# Patient Record
Sex: MALE | Race: BLACK OR AFRICAN AMERICAN | NOT HISPANIC OR LATINO | Employment: UNEMPLOYED | URBAN - METROPOLITAN AREA
[De-identification: names, ages, dates, MRNs, and addresses within clinical notes are randomized per-mention and may not be internally consistent; named-entity substitution may affect disease eponyms.]

---

## 2024-07-09 ENCOUNTER — HOSPITAL ENCOUNTER (INPATIENT)
Facility: CLINIC | Age: 24
LOS: 20 days | Discharge: PSYCHIATRIC OR SUBSTANCE USE FACILITY | End: 2024-08-05
Attending: PSYCHIATRY & NEUROLOGY | Admitting: PSYCHIATRY & NEUROLOGY
Payer: MEDICAID

## 2024-07-09 DIAGNOSIS — G47.09 OTHER INSOMNIA: ICD-10-CM

## 2024-07-09 DIAGNOSIS — Z75.9: ICD-10-CM

## 2024-07-09 DIAGNOSIS — F41.9 ANXIETY: ICD-10-CM

## 2024-07-09 DIAGNOSIS — Z60.4 ISOLATION (SOCIAL): ICD-10-CM

## 2024-07-09 DIAGNOSIS — K59.09 OTHER CONSTIPATION: ICD-10-CM

## 2024-07-09 DIAGNOSIS — F19.10 SUBSTANCE ABUSE (H): ICD-10-CM

## 2024-07-09 DIAGNOSIS — G25.9 EXTRAPYRAMIDAL AND MOVEMENT DISORDER, UNSPECIFIED: ICD-10-CM

## 2024-07-09 DIAGNOSIS — F29 PSYCHOSIS, UNSPECIFIED PSYCHOSIS TYPE (H): ICD-10-CM

## 2024-07-09 DIAGNOSIS — F23 ACUTE PSYCHOSIS (H): ICD-10-CM

## 2024-07-09 DIAGNOSIS — Z59.86 FINANCIAL INSECURITY: Primary | ICD-10-CM

## 2024-07-09 PROBLEM — F31.9 BIPOLAR DISORDER, UNSPECIFIED (H): Status: ACTIVE | Noted: 2024-07-09

## 2024-07-09 LAB
ALBUMIN SERPL BCG-MCNC: 4.3 G/DL (ref 3.5–5.2)
ALP SERPL-CCNC: 60 U/L (ref 40–150)
ALT SERPL W P-5'-P-CCNC: 11 U/L (ref 0–70)
AMPHETAMINES UR QL SCN: NORMAL
ANION GAP SERPL CALCULATED.3IONS-SCNC: 8 MMOL/L (ref 7–15)
AST SERPL W P-5'-P-CCNC: 13 U/L (ref 0–45)
BARBITURATES UR QL SCN: NORMAL
BASOPHILS # BLD AUTO: 0.1 10E3/UL (ref 0–0.2)
BASOPHILS NFR BLD AUTO: 1 %
BENZODIAZ UR QL SCN: NORMAL
BILIRUB SERPL-MCNC: 0.3 MG/DL
BUN SERPL-MCNC: 14.7 MG/DL (ref 6–20)
BZE UR QL SCN: NORMAL
CALCIUM SERPL-MCNC: 9.9 MG/DL (ref 8.6–10)
CANNABINOIDS UR QL SCN: NORMAL
CHLORIDE SERPL-SCNC: 102 MMOL/L (ref 98–107)
CREAT SERPL-MCNC: 0.91 MG/DL (ref 0.67–1.17)
DEPRECATED HCO3 PLAS-SCNC: 30 MMOL/L (ref 22–29)
EGFRCR SERPLBLD CKD-EPI 2021: >90 ML/MIN/1.73M2
EOSINOPHIL # BLD AUTO: 0.3 10E3/UL (ref 0–0.7)
EOSINOPHIL NFR BLD AUTO: 5 %
ERYTHROCYTE [DISTWIDTH] IN BLOOD BY AUTOMATED COUNT: 12.5 % (ref 10–15)
FENTANYL UR QL: NORMAL
GLUCOSE SERPL-MCNC: 98 MG/DL (ref 70–99)
HCT VFR BLD AUTO: 42.2 % (ref 40–53)
HGB BLD-MCNC: 14.8 G/DL (ref 13.3–17.7)
IMM GRANULOCYTES # BLD: 0 10E3/UL
IMM GRANULOCYTES NFR BLD: 0 %
LYMPHOCYTES # BLD AUTO: 2.2 10E3/UL (ref 0.8–5.3)
LYMPHOCYTES NFR BLD AUTO: 35 %
MCH RBC QN AUTO: 31.8 PG (ref 26.5–33)
MCHC RBC AUTO-ENTMCNC: 35.1 G/DL (ref 31.5–36.5)
MCV RBC AUTO: 91 FL (ref 78–100)
MONOCYTES # BLD AUTO: 0.5 10E3/UL (ref 0–1.3)
MONOCYTES NFR BLD AUTO: 7 %
NEUTROPHILS # BLD AUTO: 3.3 10E3/UL (ref 1.6–8.3)
NEUTROPHILS NFR BLD AUTO: 52 %
NRBC # BLD AUTO: 0 10E3/UL
NRBC BLD AUTO-RTO: 0 /100
OPIATES UR QL SCN: NORMAL
PCP QUAL URINE (ROCHE): NORMAL
PLATELET # BLD AUTO: 225 10E3/UL (ref 150–450)
POTASSIUM SERPL-SCNC: 3.8 MMOL/L (ref 3.4–5.3)
PROT SERPL-MCNC: 6.8 G/DL (ref 6.4–8.3)
RBC # BLD AUTO: 4.65 10E6/UL (ref 4.4–5.9)
SARS-COV-2 RNA RESP QL NAA+PROBE: NEGATIVE
SODIUM SERPL-SCNC: 140 MMOL/L (ref 135–145)
TSH SERPL DL<=0.005 MIU/L-ACNC: 1.98 UIU/ML (ref 0.3–4.2)
WBC # BLD AUTO: 6.3 10E3/UL (ref 4–11)

## 2024-07-09 PROCEDURE — 80053 COMPREHEN METABOLIC PANEL: CPT | Performed by: PSYCHIATRY & NEUROLOGY

## 2024-07-09 PROCEDURE — 250N000013 HC RX MED GY IP 250 OP 250 PS 637: Performed by: PSYCHIATRY & NEUROLOGY

## 2024-07-09 PROCEDURE — 85041 AUTOMATED RBC COUNT: CPT | Performed by: PSYCHIATRY & NEUROLOGY

## 2024-07-09 PROCEDURE — 87635 SARS-COV-2 COVID-19 AMP PRB: CPT | Performed by: PSYCHIATRY & NEUROLOGY

## 2024-07-09 PROCEDURE — 99285 EMERGENCY DEPT VISIT HI MDM: CPT | Performed by: PSYCHIATRY & NEUROLOGY

## 2024-07-09 PROCEDURE — 84443 ASSAY THYROID STIM HORMONE: CPT | Performed by: PSYCHIATRY & NEUROLOGY

## 2024-07-09 PROCEDURE — 80307 DRUG TEST PRSMV CHEM ANLYZR: CPT | Performed by: PSYCHIATRY & NEUROLOGY

## 2024-07-09 PROCEDURE — 36415 COLL VENOUS BLD VENIPUNCTURE: CPT | Performed by: PSYCHIATRY & NEUROLOGY

## 2024-07-09 RX ORDER — OLANZAPINE 5 MG/1
5 TABLET, ORALLY DISINTEGRATING ORAL AT BEDTIME
Status: DISCONTINUED | OUTPATIENT
Start: 2024-07-09 | End: 2024-07-15

## 2024-07-09 RX ORDER — OLANZAPINE 10 MG/1
10 TABLET, ORALLY DISINTEGRATING ORAL 2 TIMES DAILY PRN
Status: DISCONTINUED | OUTPATIENT
Start: 2024-07-09 | End: 2024-07-30

## 2024-07-09 RX ORDER — OLANZAPINE 10 MG/2ML
10 INJECTION, POWDER, FOR SOLUTION INTRAMUSCULAR 2 TIMES DAILY PRN
Status: DISCONTINUED | OUTPATIENT
Start: 2024-07-09 | End: 2024-07-30

## 2024-07-09 RX ADMIN — OLANZAPINE 5 MG: 5 TABLET, ORALLY DISINTEGRATING ORAL at 21:12

## 2024-07-09 SDOH — ECONOMIC STABILITY - INCOME SECURITY: FINANCIAL INSECURITY: Z59.86

## 2024-07-09 SDOH — SOCIAL STABILITY - SOCIAL INSECURITY: SOCIAL EXCLUSION AND REJECTION: Z60.4

## 2024-07-09 ASSESSMENT — COLUMBIA-SUICIDE SEVERITY RATING SCALE - C-SSRS: 6. HAVE YOU EVER DONE ANYTHING, STARTED TO DO ANYTHING, OR PREPARED TO DO ANYTHING TO END YOUR LIFE?: NO

## 2024-07-09 ASSESSMENT — ACTIVITIES OF DAILY LIVING (ADL)
ADLS_ACUITY_SCORE: 35
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
## 2024-07-10 ENCOUNTER — TELEPHONE (OUTPATIENT)
Dept: BEHAVIORAL HEALTH | Facility: CLINIC | Age: 24
End: 2024-07-10
Payer: MEDICAID

## 2024-07-10 PROCEDURE — 99418 PROLNG IP/OBS E/M EA 15 MIN: CPT | Performed by: NURSE PRACTITIONER

## 2024-07-10 PROCEDURE — 99255 IP/OBS CONSLTJ NEW/EST HI 80: CPT | Performed by: NURSE PRACTITIONER

## 2024-07-10 PROCEDURE — 250N000013 HC RX MED GY IP 250 OP 250 PS 637: Performed by: PSYCHIATRY & NEUROLOGY

## 2024-07-10 RX ADMIN — OLANZAPINE 5 MG: 5 TABLET, ORALLY DISINTEGRATING ORAL at 23:05

## 2024-07-10 NOTE — ED NOTES
IP MH Referral Acuity Rating Score (RARS)    LMHP complete at referral to IP MH, with DEC; and, daily while awaiting IP MH placement. Call score to PPS.  CRITERIA SCORING   New 74 HH and Involuntary for IP MH (not adolescent) 0/1   Boarding over 24 hours 0/1   Vulnerable adult at least 55+ with multiple co morbidities; or, Patient age 11 or under 0/1   Suicide ideation without relief of precipitating factors 1/1   Current plan for suicide 0/1   Current plan for homicide 0/1   Imminent risk or actual attempt to seriously harm another without relief of factors precipitating the attempt 0/1   Severe dysfunction in daily living (ex: complete neglect for self care, extreme disruption in vegetative function, extreme deterioration in social interactions) 1/1   Recent (last 2 weeks) or current physical aggression in the ED 0/1   Restraints or seclusion episode in ED 0/1   Verbal aggression, agitation, yelling, etc., while in the ED 0/1   Active psychosis with psychomotor agitation or catatonia 1/1   Need for constant or near constant redirection (from leaving, from others, etc).  0/1   Intrusive or disruptive behaviors 0/1   TOTAL Acuity Total Score: 3     LAXMI Mg

## 2024-07-10 NOTE — TELEPHONE ENCOUNTER
R: [place]  Access Inpatient Bed Call Log  7/9/2024 11:40 PM  Intake has called facilities that have not updated their bed status within the last 12 hours.??      ADULTS:     *[place]  [place] -- [facility]: @ cap per website.  [place] -- [facility]:  @ Cap per website.    [place] -- [facility]: @ Cap per website.  [place] -- [facility]: @ Cap per website.   [place] -- [facility]: @ Cap per website.  [facility] -- [facility]: @ Cap per website.   [place] -- [facility] beds @ Posting 3 beds. Ages 18-28, Voluntary only, COVID test req'd, NO aggression, physical or sexual assault, violence hx or drug abuse, or psychosis   [place] -- [facility]: @ Cap per website.  [place] -- [facility]: @ cap per website.  [place] -- [facility]:  @ Cap per website.      Pt remains on waitlist pending appropriate placement availability.
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
## 2024-07-10 NOTE — MEDICATION SCRIBE - ADMISSION MEDICATION HISTORY
Medication Scribe Admission Medication History    Admission medication history is complete. The information provided in this note is only as accurate as the sources available at the time of the update.    Information Source(s): Patient and CareEverywhere/SureScripts via in-person    Pertinent Information: Pt reports no medications, otc. Pt has no dispense HX    Changes made to PTA medication list:  Added: None  Deleted: None  Changed: None    Allergies reviewed with patient and updates made in EHR: yes    Medication History Completed By: Kim Tomas 7/10/2024 9:51 AM    No outpatient medications have been marked as taking for the 7/9/24 encounter (Hospital Encounter).

## 2024-07-10 NOTE — ED PROVIDER NOTES
[facility] ED Mental Health Handoff Note:       Brief HPI:  This is a 24 year old male signed out to me by Dr. Duran.  See initial ED Provider note for full details of the presentation. Interval history is pertinent for no reported changes or events on the shift prior.  Patient is being admitted due to acute onset of psychosis.  Currently voluntary but deemed to be holdable if necessary.    Home meds reviewed and ordered/administered: Yes    Medically stable for inpatient mental health admission: Yes.    Evaluated by mental health: Yes. The recommendation is for inpatient mental health treatment. Bed search in process    Safety concerns: At the time I received sign out, there were no safety concerns.    Hold Status:  Active Orders   N/A            Exam:   Patient Vitals for the past 24 hrs:   BP Temp Temp src Pulse Resp SpO2   07/09/24 1859 105/71 98  F (36.7  C) Oral 78 16 99 %     General: Patient is in no acute distress and is resting comfortably.  HEENT: Normocephalic atraumatic  Neck: Supple  Cardiovascular: Heart rate normal  Pulmonary: Patient is in no respiratory distress  Extremities: No signs of any significant or life-threatening trauma.  Neurologic: No new focal neurologic deficits.        ED Course:    Medications   OLANZapine zydis (zyPREXA) ODT tab 10 mg (has no administration in time range)   OLANZapine (zyPREXA) injection 10 mg (has no administration in time range)   OLANZapine zydis (zyPREXA) ODT tab 5 mg (5 mg Oral $Given 7/9/24 2112)            There were no significant events during my shift.  The patient was seen by the psychiatry consult service, plan to continue olanzapine and plan for voluntary admission          Impression:    ICD-10-CM    1. Acute psychosis (H)  F23       2. Substance abuse (H)  F19.10           Plan:    Awaiting inpatient mental health admission/transfer.  The patient is medically clear for psychiatric admission.      RESULTS:   Results for orders placed or  "performed during the hospital encounter of 07/09/24 (from the past 24 hour(s))   [facility] Assessment Consult Order:     Status: None ()    Collection Time: 07/09/24  7:07 PM    Tona KaminskiLashay [email or web address]     7/9/2024  9:40 PM  Diagnostic Evaluation Consultation  Crisis Assessment    Patient Name: Pepe Stafford  Age:  24 year old  Legal Sex: male  Gender Identity: male  Race: Black or   Ethnicity: Not  or   Language: English      Patient was assessed: In person   Crisis Assessment Start Date: 07/09/24  Crisis Assessment Start Time: 1930  Crisis Assessment Stop Time: 2000  Patient location: [facility] EMERGENCY DEPARTMENT                             [facility]    Referral Data and Chief Complaint  Pepe Stafford presents to the ED with family/friends (with   mother). Patient is presenting to the ED for the following   concerns: Paranoia, Other (see comment) (katja, psychosis).     Factors that make the mental health crisis life threatening or   complex are:  Pt presents to the ED with his mother with concerns   of katja and psychosis. Upon assessment, pt speaks barely above a   whisper and requires several prompts in order for this writer to   clearly understand him. He tells this writer that he has been   extensively researching \"angelology\" and talks at length about   various Mu-ism references, including seraphims and cherubims.   He endorses auditory and visual hallucinations. When asked about   their content, pt tells this writer that the voices are talking   to him about the Bible \"all the time\" as well as about \"[place]\"   and \"apprenticeship\". He reports hearing \"[place] languages\" and   says that he has been speaking to a ghost. He states that the   voices are also sometimes negative in nature, such as telling him   that his mother is being harmed. Pt tells this writer that he has   also been experiencing \"visions\" of snakes, " "animals, crystals,   and eggs. He has seen himself be eaten by a shark as well as   someone stab him. Pt lives with his mother and exhibits   significant paranoia toward their roommate. He believes that   their roommate has been using her \"eyes for witchcraft\" to   terrify his mother. He also thinks that she has potentially been   coughing to hypnotize his mother. Pt believes that their roommate   is trying to trick him and has been looking at him \"like a   robot\". Pt notes that he did recently threaten their roommate as   he was concerned for his mother's safety. He reports having a   pending terroristic threat charge related to this with court on   7/24. He denies any HI toward his roommate or others at present.   When asked about SI, pt reports thinking about going to [facility]   but denies a plan or intent for suicide. Pt tells this writer   that he has been sleeping and eating very minimally. When asked   about substance use, he reports recently drinking a Four Loco and   using psilocybin mushrooms earlier this year. He states that he   is not using psilocybin mushrooms at present but did \"take a lot\"   earlier this year. He is worried that he may have caused harm to   himself from his psilocybin use. Pt tells this writer that he has   \"no clue what is going on\" at present but that he is \"sane\" and   that his only goal is to protect his mother and go back to work   to provide for her. He notes that his family has been having   difficulty affording rent and food lately..      Informed Consent and Assessment Methods  Explained the crisis assessment process, including applicable   information disclosures and limits to confidentiality, assessed   understanding of the process, and obtained consent to proceed   with the assessment.  Assessment methods included conducting a   formal interview with patient, review of medical records,   collaboration with medical staff, and obtaining relevant   collateral information from " family and community providers when   available.  : done     Patient response to interventions: acceptance expressed,   verbalizes understanding  Coping skills were attempted to reduce the crisis:  Pt engaged in   a conversation wiht this writer about his mental health.     History of the Crisis   Pt was seen in the ED at [facility] from 05/16-05/17/24 for   psychosis. At that time, pt's presentation was suspected to be   linked to recent psilocybin use. Chart review indicates that pt's   family possibly has a history of psychosis. There is mention in   the chart that pt's father has experienced similar symptoms.    Brief Psychosocial History  Family:  Single, Children no  Support System:  Parent(s)  Employment Status:  unemployed  Source of Income:  other (see comments) (family support)  Financial Environmental Concerns:  unable to afford   rent/mortgage, unemployed, unable to afford food (Pt reports that   his family has been having difficulty paying rent and purchasing   food. He would like to work but has been unable to at present due   to his mental health concerns.)  Current Hobbies:  group/social activities  Barriers in Personal Life:  mental health concerns    Significant Clinical History  Current Anxiety Symptoms:  anxious, racing thoughts, excessive   worry  Current Depression/Trauma:  sense of doom, thoughts of   death/suicide (endorses passive SI only at present)  Current Somatic Symptoms:  racing thoughts, excessive worry,   anxious  Current Psychosis/Thought Disturbance:  auditory hallucinations,   visual hallucinations (paranoid delusions)  Current Eating Symptoms:  loss of appetite  Chemical Use History:  Alcohol: Other (comments) (Pt's mother   reports that he is using alcohol excessively. Pt tells this   writer that he recently drank a Four Loco and vomited afterwards.   He reports that he does not use alcohol routinely.)  Last Use::  (unknown last date of use)  Benzodiazepines: None  Opiates:  "None  Cocaine: None  Marijuana: None  Other Use: Other (comments) (psilocybin mushrooms)  Last Use::  (Pt reports that he used psilocybin mushrooms \"months   ago\" and \"took a lot this year\". He worries that the psilocybin   mushrooms may have affected his functioning.)   Past diagnosis:  No known past diagnosis  Family history:  Other (Chart review indicates a family history   of substance use and suggests that pt's father may have also   experienced psychosis.)  Past treatment:  No known formal treatment attempts  Details of most recent treatment:  Pt has no mental healthcare   providers at present.  Other relevant history:  No other relevant history.       Collateral Information  Is there collateral information: Yes     Collateral information name, relationship, phone number:  Annette Stafford, mother, PH: [phone] (using )    What happened today: Annette's sister encouraged her to bring pt to   the ED for worsening psychosis.     What is different about patient's functioning: Since June 2024,   pt has been experiencing worsening psychosis. He has been   experiencing auditory and visual hallucinatons on a daily basis.   The voices have been telling him to do things, such as pace back   and forth. He has been \"awake at all hours and not sleeping\". He   has been making a lot of statements that do not make sense. Some   of his talk has been referencing killing. Pt lives with his   mother and, about two weeks ago, he threatened to kill their   roommate.     Concern about alcohol/drug use: Pt is drinking alcohol   excessively.     What do you think the patient needs: Pt needs hospitalization.      Has patient made comments about wanting to kill   themselves/others: yes    If d/c is recommended, can they take part in safety/aftercare   planning:  yes    Additional collateral information:  Annette is recommending that pt   remain in the hospital for further care.     Risk Assessment  [place] Suicide " "Severity Rating Scale Full Clinical Version:   7/9/24  Suicidal Ideation  Q1 Wish to be Dead (Lifetime): Yes  Q2 Non-Specific Active Suicidal Thoughts (Lifetime): Yes  3. Active Suicidal Ideation with any Methods (Not Plan) Without   Intent to Act (Lifetime): No  Q4 Active Suicidal Ideation with Some Intent to Act, Without   Specific Plan (Lifetime): No  Q5 Active Suicidal Ideation with Specific Plan and Intent   (Lifetime): No  Q6 Suicide Behavior (Lifetime): no     Suicidal Behavior (Lifetime)  Actual Attempt (Lifetime): No  Has subject engaged in non-suicidal self-injurious behavior?   (Lifetime): No  Interrupted Attempts (Lifetime): No  Aborted or Self-Interrupted Attempt (Lifetime): No  Preparatory Acts or Behavior (Lifetime): No    [place] Suicide Severity Rating Scale Recent: 7/9/24  Suicidal Ideation (Recent)  Q1 Wished to be Dead (Past Month): yes  Q2 Suicidal Thoughts (Past Month): yes (Pt tells this writer that   he has been having thoughts of \"going to heaven\" but denies a   specific suicidal plan. Chart review from his ED visit at [facility] on 05/16/24 indicates that pt was also experiencing   suicidal ideation at that time.)  Q3 Suicidal Thought Method: no  Q4 Suicidal Intent without Specific Plan: no  Q5 Suicide Intent with Specific Plan: no  Level of Risk per Screen: low risk  Intensity of Ideation (Recent)  Most Severe Ideation Rating (Past 1 Month): 2  Frequency (Past 1 Month): Daily or almost daily  Duration (Past 1 Month): 1-4 hours/a lot of time  Controllability (Past 1 Month): Easily able to control thoughts  Deterrents (Past 1 Month): Deterrents definitely stopped you from   attempting suicide  Reasons for Ideation (Past 1 Month): Completely to end or stop   the pain (You couldn't go on living with the pain or how you were   feeling)  Suicidal Behavior (Recent)  Actual Attempt (Past 3 Months): No  Total Number of Actual Attempts (Past 3 Months): 0  Has subject engaged in non-suicidal " self-injurious behavior?   (Past 3 Months): No  Interrupted Attempts (Past 3 Months): No  Total Number of Interrupted Attempts (Past 3 Months): 0  Aborted or Self-Interrupted Attempt (Past 3 Months): No  Total Number of Aborted or Self-Interrupted Attempts (Past 3   Months): 0  Preparatory Acts or Behavior (Past 3 Months): No  Total Number of Preparatory Acts (Past 3 Months): 0    Environmental or Psychosocial Events: challenging interpersonal   relationships, excessive debt, poor finances  Protective Factors: Protective Factors: strong bond to family   unit, community support, or employment    Does the patient have thoughts of harming others? Feels Like   Hurting Others: yes  Previous Attempt to Hurt Others: no  Violence Threats in Past 6 Months: Pt reports that he is   currently being charged with terroristic threats after   threatening to harm his roommate. As part of pt's paranoia, he   believes that his roommate may be causing harm to his mother,   which prompted the threats.  Current Violence Plan or Thoughts: Pt denies current violent   plans or thoughts.  Is the patient engaging in sexually inappropriate behavior?: no  Duty to warn initiated: no (No duty to warn needed at this time.)    Is the patient engaging in sexually inappropriate behavior?  no          Mental Status Exam   Affect: Constricted  Appearance: Appropriate  Attention Span/Concentration: Attentive  Eye Contact: Engaged    Fund of Knowledge: Appropriate   Language /Speech Content: Fluent  Language /Speech Volume: Soft  Language /Speech Rate/Productions: Hyperverbal, Pressured  Recent Memory: Variable  Remote Memory: Intact  Mood: Anxious  Orientation to Person: Yes   Orientation to Place: Yes  Orientation to Time of Day: Yes  Orientation to Date: Yes     Situation (Do they understand why they are here?): Answer (please   comment) (partial understanding)  Psychomotor Behavior: Normal  Thought Content: Delusions, Hallucinations, Paranoia,  "Suicidal   (passive SI only)  Thought Form: Tangential     Medication  Psychotropic medications:   Medication Orders - Psychiatric (From admission, onward)      Start     Dose/Rate Route Frequency Ordered Stop    07/09/24 2200  OLANZapine zydis (zyPREXA) ODT tab 5 mg         5 mg Oral AT BEDTIME 07/09/24 2030 07/09/24 2030  OLANZapine (zyPREXA) injection 10 mg         10 mg Intramuscular 2 TIMES DAILY PRN 07/09/24 2030 07/09/24 2029  OLANZapine zydis (zyPREXA) ODT tab 10 mg         10 mg Oral 2 TIMES DAILY PRN 07/09/24 2030               Current Care Team  Patient Care Team:  System, Provider Not In as PCP - General (Clinic)    Diagnosis  Patient Active Problem List   Diagnosis Code    Bipolar disorder, unspecified (H) F31.9    Psychosis (H) F29       Primary Problem This Admission  Active Hospital Problems    Bipolar disorder, unspecified (H) F31.9      Psychosis (H) F29    Clinical Summary and Substantiation of Recommendations   It is the recommendation of this writer that pt be admitted to an   inpatient psychiatric unit on the basis of his current episode of   katja with psychosis. Pt reports that he has been sleeping and   eating minimally and extensively researching \"angelology\". He   endorses experiencing AH/VH and believes that his roommate may be   placing his mother at harm by hypnotizing her. When asked about   SI, pt also shares with this writer that he has been thinking   about \"going to heaven\". Pt is voluntary for inpatient   hospitalization.    Severe psychiatric, behavioral or other comorbid conditions are   appropriate for management at inpatient mental health as   indicated by at least one of the following: Psychiatric Symptoms,   Impaired impulse control, judgement, or insight, Symptoms of   impact to function  Severe dysfunction in daily living is present as indicated by at   least one of the following: Incapacitation because of grave   disability, Extreme disruption in vegetative " function, Extreme   deterioration in social interactions, Complete inability to   maintain any appropriate aspect of personal responsibility in any   adult roles  Situation and expectations are appropriate for inpatient care:   Around-the-clock medical and nursing care to address symptoms and   initiate intervention is required, Voluntary treatment at lower   level of care is not feasible, Patient management/treatment at   lower level of care is not feasible or is inappropriate  Inpatient mental health services are necessary to meet patient   needs and at least one of the following: Specific condition   related to admission diagnosis is present and judged likely to   deteriorate in absence of treatment at proposed level of care      Patient coping skills attempted to reduce the crisis:  Pt engaged   in a conversation wiht this writer about his mental health.    Disposition  Recommended disposition: Inpatient Mental Health        Reviewed case and recommendations with attending provider.   Attending Name: Dr. Quiroz       Attending concurs with disposition: yes       Patient and/or validated legal guardian concurs with disposition:     yes       Final disposition:  inpatient mental health    Legal status on admission: Voluntary/Patient has signed consent   for treatment    Assessment Details   Total duration spent with the patient: 30 min     CPT code(s) utilized: 04119 - Psychotherapy for Crisis - 60   (30-74*) min    LAXMI Mg, Psychotherapist  [facility] - Triage & Transition Services  Callback: [phone]           Urine Drug Screen     Status: Normal    Collection Time: 07/09/24  9:14 PM    Narrative    The following orders were created for panel order Urine Drug Screen.  Procedure                               Abnormality         Status                     ---------                               -----------         ------                     Urine Drug Screen Panel[[id number]]      Normal               Final result                 Please view results for these tests on the individual orders.   CBC with platelets differential     Status: None    Collection Time: 07/09/24  9:14 PM    Narrative    The following orders were created for panel order CBC with platelets differential.  Procedure                               Abnormality         Status                     ---------                               -----------         ------                     CBC with platelets and d...[[id number]]                      Final result                 Please view results for these tests on the individual orders.   Comprehensive metabolic panel     Status: Abnormal    Collection Time: 07/09/24  9:14 PM   Result Value Ref Range    Sodium 140 135 - 145 mmol/L    Potassium 3.8 3.4 - 5.3 mmol/L    Carbon Dioxide (CO2) 30 (H) 22 - 29 mmol/L    Anion Gap 8 7 - 15 mmol/L    Urea Nitrogen 14.7 6.0 - 20.0 mg/dL    Creatinine 0.91 0.67 - 1.17 mg/dL    GFR Estimate >90 >60 mL/min/1.73m2    Calcium 9.9 8.6 - 10.0 mg/dL    Chloride 102 98 - 107 mmol/L    Glucose 98 70 - 99 mg/dL    Alkaline Phosphatase 60 40 - 150 U/L    AST 13 0 - 45 U/L    ALT 11 0 - 70 U/L    Protein Total 6.8 6.4 - 8.3 g/dL    Albumin 4.3 3.5 - 5.2 g/dL    Bilirubin Total 0.3 <=1.2 mg/dL   TSH with free T4 reflex     Status: Normal    Collection Time: 07/09/24  9:14 PM   Result Value Ref Range    TSH 1.98 0.30 - 4.20 uIU/mL   Asymptomatic COVID-19 Virus (Coronavirus) by PCR Nasopharyngeal     Status: Normal    Collection Time: 07/09/24  9:14 PM    Specimen: Nasopharyngeal; Swab   Result Value Ref Range    SARS CoV2 PCR Negative Negative    Narrative    Testing was performed using the Xpert Xpress SARS-CoV-2 Assay on the Cepheid Gene-Xpert Instrument Systems. Additional information about this Emergency Use Authorization (EUA) assay can be found via the Lab Guide. This test should be ordered for the detection of SARS-CoV-2 in individuals who meet SARS-CoV-2 clinical and/or  epidemiological criteria as well as from individuals without symptoms or other reasons to suspect COVID-19. Test performance for asymptomatic patients has only been established in anterior nasal swab specimens. This test is for in vitro diagnostic use under the FDA EUA for laboratories certified under CLIA to perform high complexity testing. This test has not been FDA cleared or approved. A negative result does not rule out the presence of PCR inhibitors in the specimen or target RNA concentration below the limit of detection for the assay. The possibility of a false negative should be considered if the patient's recent exposure or clinical presentation suggests COVID-19. This test was validated by the [facility]. This laboratory is certified under the Clinical Laboratory Improvement Amendments (CLIA) as qualified to perform high complexity laboratory testing.     Urine Drug Screen Panel     Status: Normal    Collection Time: 07/09/24  9:14 PM   Result Value Ref Range    Amphetamines Urine Screen Negative Screen Negative    Barbituates Urine Screen Negative Screen Negative    Benzodiazepine Urine Screen Negative Screen Negative    Cannabinoids Urine Screen Negative Screen Negative    Cocaine Urine Screen Negative Screen Negative    Fentanyl Qual Urine Screen Negative Screen Negative    Opiates Urine Screen Negative Screen Negative    PCP Urine Screen Negative Screen Negative   CBC with platelets and differential     Status: None    Collection Time: 07/09/24  9:14 PM   Result Value Ref Range    WBC Count 6.3 4.0 - 11.0 10e3/uL    RBC Count 4.65 4.40 - 5.90 10e6/uL    Hemoglobin 14.8 13.3 - 17.7 g/dL    Hematocrit 42.2 40.0 - 53.0 %    MCV 91 78 - 100 fL    MCH 31.8 26.5 - 33.0 pg    MCHC 35.1 31.5 - 36.5 g/dL    RDW 12.5 10.0 - 15.0 %    Platelet Count 225 150 - 450 10e3/uL    % Neutrophils 52 %    % Lymphocytes 35 %    % Monocytes 7 %    % Eosinophils 5 %    % Basophils 1 %    %  Immature Granulocytes 0 %    NRBCs per 100 WBC 0 <1 /100    Absolute Neutrophils 3.3 1.6 - 8.3 10e3/uL    Absolute Lymphocytes 2.2 0.8 - 5.3 10e3/uL    Absolute Monocytes 0.5 0.0 - 1.3 10e3/uL    Absolute Eosinophils 0.3 0.0 - 0.7 10e3/uL    Absolute Basophils 0.1 0.0 - 0.2 10e3/uL    Absolute Immature Granulocytes 0.0 <=0.4 10e3/uL    Absolute NRBCs 0.0 10e3/uL             MD Corrine Galvan David, MD  07/10/24 2149

## 2024-07-10 NOTE — PROGRESS NOTES
Triage & Transition Services, [facility]     Pepe S [facility]  July 10, 2024      Pepe is followed related to long wait time for admission pt continues to await inpt admission.  Reports sleeping well, eating.  Is minimally responsive during brief check-in.  Patient continues to endorse need for inpatient mental health programming, review of records indicate significant history of delusional thinking, disruptive behavior, decompensation. . Please see initial [facility] Crisis Assessment completed for complete assessment information. Medical record is reviewed.  While patient is in the ED, care team is working towards Learn and Demonstrate at [employer] on Crisis Stabilization . Additional notes include Pt was seen by Debi Ortez, psychiatric nurse practitioner for medication management today.    Patient was seen yes  Mode of Assessment: Virtual: iPad    There no significant status changes.       Plan:  inpatient mental health     Plan for Care reviewed with Assigned Medical Provider?   yes    Comments:  Dr Corrine Anderson will follow and meet with patient/family/care team as able or requested.     Luz Patel, [facility], Extended Care   [phone]

## 2024-07-10 NOTE — PROGRESS NOTES
"Pepe MCCONNELL Kpelisabeth  July 9, 2024  Plan of Care Hand-off Note     Patient Care Path: inpatient mental health    Plan for Care:   It is the recommendation of this writer that pt be admitted to an inpatient psychiatric unit on the basis of his current episode of katja with psychosis. Pt reports that he has been sleeping and eating minimally and extensively researching \"angelology\". He endorses experiencing AH/VH and believes that his roommate may be placing his mother at harm by hypnotizing her. When asked about SI, pt also shares with this writer that he has been thinking about \"going to heaven\". Pt is voluntary for inpatient hospitalization.    Identified Goals and Safety Issues: decrease katja and psychosis    Overview:  Annette Stafford, mother, PH: [phone] (needs )      Legal Status at Admission: Voluntary/Patient has signed consent for treatment    Psychiatry Consult: A psychiatry consult has been placed.    Updated MD and RN regarding plan of care.      Lashay Mckeon, [facility]        "

## 2024-07-10 NOTE — TELEPHONE ENCOUNTER
7:41 AM: Intake called PC to see if they can review. Intake to fax Pt info.    7:52 AM: Pt face sheet, labs and ED notes sent to PC via fax.    8:19 AM: PC called requesting medical clearance from Provider and wanting to know why Pt received Zyprexa last night.    8:36 AM: Intake called to speak with RN at [facility] ED. RN unavailable. HUC to inform the team about med clearance needed and PC inquiring about Zyprexa administration.    9:47 AM: Intake faxed recent assessment to PC stating that Zyprexa will be continued for irritation and agitation.    9:51 AM: Needing high level of care d/t current Sx and d/t threats towards roommate.        R: [place]  Access Inpatient Bed Call Log 7/10/24 8:10 AM    Intake has called facilities that have not updated the bed status within the last 12 hours.                             [facility] is at capacity           [facility] is posting 0 beds. [phone]   [facility] is posting 0 beds. Negative covid required  [facility] is posting 0 beds. Neg covid. No high school/Irish-psych. [phone]. Per call at 8:10am, at cab. Can try after 9:30am  [facility] is posting 0 beds. [phone]  [facility] is posting 0 beds. [phone]   [facility] is posting 3 beds. Negative covid. [phone]. Per call @7:38am  [facility] is posting 0 beds [phone]      Pt remains on the work list pending appropriate bed availability.

## 2024-07-10 NOTE — CONSULTS
"Pepe MCCONNELL [facility] MRN# [id number]   Age: 24 year old YOB: 2000   Date of Admission to ED: 7/9/2024    In person visit Details:     Patient was assessed and interviewed face-to-face in person with this writer   Assessment methods included conducting a formal interview with patient, review of medical records, collaboration with medical staff, and obtaining relevant collateral information from family and community providers when available.    PHI Goncalves CNP            Contacts:   Attending Physician: Roe Castle MD     Current Outpatient Psychiatrist: none   Primary Care Provider: System, Provider Not In  Family/friends/guardian: mother/Annette [facility] [phone]  Per [facility] ED notes: \"Mom and roommate are both deaf.  line was used for ASL.\"  Family/friends/guardian: aunt in [place]/[facility] - [phone] who had custody of the Patient from age [age]yo in [place]. Patient relocated to [place] to mom's about 2-3 years ago. (Per [facility] ED notes)  Therapist:   :        Reason for Consult:       Psychiatry IP Consult: katja psychosis  Requesting Provider:  ED Provider           History of Present Illness:   Patient presented to the ED on 7/9/2024 for psychosis, in the context of he was hearing things that were saying bad things.      Interview with patient:   Pepe reports his mom wanted him to come to the ED because she was concerned.  He reports there has been a lot going on in his house.  He has been hearing things.  He has not been able to sleep.  He stays up and plays games all night and then sometimes sleeps during the day.  He said the stress at the house has been up.  He reports he through up recently for no reason.  He has been doing way to much.  He was not able to say what he was doing. He reports his mom would not look at him the other day.  There was a roommate living with them.  He called the police on the roommate several times for threatening his mom.  He is frustrated " every day.  The roommate called the police on him, because he was not in his right mind and threatened to stab her.  He reports he did say it out loud but the roommate is supposedly deaf.  His mom had the roommate leave last week because he had to go to senior care for threatening her and he could not return home if she was there.  He has a court case on July 20th.  He denies recreational substance use other than drinking some alcohol.  He reports he drinks Four Lokos, which is about 4%.  He denied other substance use. He reports his dad is in [place] and not really involved.  He was born in [place], and has lived in [place], [facility], and [place], before moving to [place] in 2021. He has been living with his mom for about a year. He denies current depression, anxiety or anger. Denies SI, reports he has had SI in the past.  He reports he made a suicide attempt about a month ago which led to his first admission to the ED.  He endorses AH - referring to angelology and  - saying he was walking to the store and his shoelaces came untied.  He also reports seeing floating eyes. He has not been eating because there is no food to eat.  Denies problems with elimination.      Substance use is relevant for alcohol and mushrooms. UDS in ED was negative.     Social Hx:  Living situation: Lives with his mom.  Roommate that was living with them was recently asked to leave due to pending court case between the roommate and the patient.   Highest level of education: did not finish high school - reports he would like to finish high school   Employment status: currently unemployed, reports he was working at [employer] until a few weeks ago when he was suddenly fired.   Financial stressors: yes - reports unemployed and that there is no food in the house.   Family/Friends/Support ppl: Mom  Legal Issues: yes, court date on July 20th for threatening to stab the roommate that has since left the home.  Patient reports he thought the roommate was threatening his mom.  "      Collateral information from the famly/friend: none         Collateral information from chart review:     Reviewed DEC assessment and ED notes.     Per DEC assessment completed on 7/9/2024:   \" Upon assessment, pt speaks barely above a whisper and requires several prompts in order for this writer to clearly understand him. He tells this writer that he has been extensively researching \"angelology\" and talks at length about various Latter-day references, including seraphims and cherubims. He endorses auditory and visual hallucinations. When asked about their content, pt tells this writer that the voices are talking to him about the Bible \"all the time\" as well as about \"[place]\" and \"apprenticeship\". He reports hearing \"[place] languages\" and says that he has been speaking to a ghost. He states that the voices are also sometimes negative in nature, such as telling him that his mother is being harmed. Pt tells this writer that he has also been experiencing \"visions\" of snakes, animals, crystals, and eggs. He has seen himself be eaten by a shark as well as someone stab him. Pt lives with his mother and exhibits significant paranoia toward their roommate. He believes that their roommate has been using her \"eyes for witchcraft\" to terrify his mother. He also thinks that she has potentially been coughing to hypnotize his mother. Pt believes that their roommate is trying to trick him and has been looking at him \"like a robot\". Pt notes that he did recently threaten their roommate as he was concerned for his mother's safety. He reports having a pending terroristic threat charge related to this with court on 7/24. He denies any HI toward his roommate or others at present. When asked about SI, pt [facility] but denies a plan or intent for suicide. Pt tells this writer that he has been sleeping and eating very minimally. When asked about substance use, he reports recently drinking a Four Loco and " "using psilocybin mushrooms earlier this year. He states that he is not using psilocybin mushrooms at present but did \"take a lot\" earlier this year. He is worried that he may have caused harm to himself from his psilocybin use. Pt tells this writer that he has \"no clue what is going on\" at present but that he is \"sane\" and that his only goal is to protect his mother and go back to work to provide for her. He notes that his family has been having difficulty affording rent and food lately.\"    Per [facility] ED notes 5/17/2024 by Perry Cage LPC:   \"\"He has been acting strange for 2-3 weeks. He will drink and but she didn't know that he has been drinking to excess, smoking weed, and saying that he is talking to the devil and talking to spirits. Pt suffers from depression, has been sensitive and defensive, but they've been dealing with that for several years. She reports that she can't hear so she can't hear what is going on and she works nights so she can't really see what she is up to. However, the son has been acting very different and has been very angry. Over the past year he has been damaging doors and walls. She found a knife in his room and vodka.\"              Psychiatric History:     As documented in HPI, Impression, collateral information from chart review & family/friend/guardian, DEC assessment and as listed below (not exhaustive).    Past Psychiatric Diagnosis:   Patient denies.     Past Medication Trials: none      Trauma/Abuse history: Denied.           Past Medical and Surgical History:     PAST MEDICAL HISTORY: No past medical history on file.    PAST SURGICAL HISTORY: No past surgical history on file.          Substance Use History:   As documented in HPI, Impression, collateral information from chart review & family/friend/guardian, DEC assessment and as listed below (not exhaustive).    Substance Use:     Yes, alcohol and mushrooms          Family History:   As documented in HPI, " "Impression, collateral information from chart review & family/friend/guardian, DEC assessment and as listed below (not exhaustive).    Family psychiatric/mental health history includes:     None known at this time.           Allergies and Medications:   No Known Allergies    Medications:    Current Facility-Administered Medications   Medication Dose Route Frequency Provider Last Rate Last Admin    OLANZapine (zyPREXA) injection 10 mg  10 mg Intramuscular BID PRN Viraj Quiroz MD        OLANZapine zydis (zyPREXA) ODT tab 10 mg  10 mg Oral BID PRN Viraj Quiroz MD        OLANZapine zydis (zyPREXA) ODT tab 5 mg  5 mg Oral At Bedtime Viraj Quiroz MD   5 mg at 07/09/24 2112     No current outpatient medications on file.           Mental Status Exam:   Appearance:  awake, alert, adequately groomed, and casually dressed, thin, black male with facial hair  Attitude:  cooperative  Eye Contact:  fair  Mood:   \"elated\"  Affect:  calm and neutral  Speech:   mostly clear and coherent, some mumbling  Psychomotor Behavior:  no evidence of tardive dyskinesia, dystonia, or tics  Thought Process:   mostly linear, some disorganization and memory issues  Associations:  no loose associations  Thought Content:   denies SI/HI, endorses AH/VH  Insight:  limited  Judgment:  fair  Oriented to:  time, person, and place  Attention Span and Concentration:  fair  Recent and Remote Memory:  limited  Fund of Knowledge: low-normal  Muscle Strength and Tone: normal  Gait and Station:  not observed.         Physical Exam:     /71   Pulse 78   Temp 98  F (36.7  C) (Oral)   Resp 16   SpO2 99%   Weight is 0 lbs 0 oz Data Unavailable There is no height or weight on file to calculate BMI.    QTc: none, no EKG to review    Laboratory/Imaging:    Recent Results (from the past 72 hour(s))   Comprehensive metabolic panel    Collection Time: 07/09/24  9:14 PM   Result Value Ref Range    Sodium 140 135 - 145 mmol/L    Potassium 3.8 " 3.4 - 5.3 mmol/L    Carbon Dioxide (CO2) 30 (H) 22 - 29 mmol/L    Anion Gap 8 7 - 15 mmol/L    Urea Nitrogen 14.7 6.0 - 20.0 mg/dL    Creatinine 0.91 0.67 - 1.17 mg/dL    GFR Estimate >90 >60 mL/min/1.73m2    Calcium 9.9 8.6 - 10.0 mg/dL    Chloride 102 98 - 107 mmol/L    Glucose 98 70 - 99 mg/dL    Alkaline Phosphatase 60 40 - 150 U/L    AST 13 0 - 45 U/L    ALT 11 0 - 70 U/L    Protein Total 6.8 6.4 - 8.3 g/dL    Albumin 4.3 3.5 - 5.2 g/dL    Bilirubin Total 0.3 <=1.2 mg/dL   TSH with free T4 reflex    Collection Time: 07/09/24  9:14 PM   Result Value Ref Range    TSH 1.98 0.30 - 4.20 uIU/mL   Asymptomatic COVID-19 Virus (Coronavirus) by PCR Nasopharyngeal    Collection Time: 07/09/24  9:14 PM    Specimen: Nasopharyngeal; Swab   Result Value Ref Range    SARS CoV2 PCR Negative Negative   Urine Drug Screen Panel    Collection Time: 07/09/24  9:14 PM   Result Value Ref Range    Amphetamines Urine Screen Negative Screen Negative    Barbituates Urine Screen Negative Screen Negative    Benzodiazepine Urine Screen Negative Screen Negative    Cannabinoids Urine Screen Negative Screen Negative    Cocaine Urine Screen Negative Screen Negative    Fentanyl Qual Urine Screen Negative Screen Negative    Opiates Urine Screen Negative Screen Negative    PCP Urine Screen Negative Screen Negative   CBC with platelets and differential    Collection Time: 07/09/24  9:14 PM   Result Value Ref Range    WBC Count 6.3 4.0 - 11.0 10e3/uL    RBC Count 4.65 4.40 - 5.90 10e6/uL    Hemoglobin 14.8 13.3 - 17.7 g/dL    Hematocrit 42.2 40.0 - 53.0 %    MCV 91 78 - 100 fL    MCH 31.8 26.5 - 33.0 pg    MCHC 35.1 31.5 - 36.5 g/dL    RDW 12.5 10.0 - 15.0 %    Platelet Count 225 150 - 450 10e3/uL    % Neutrophils 52 %    % Lymphocytes 35 %    % Monocytes 7 %    % Eosinophils 5 %    % Basophils 1 %    % Immature Granulocytes 0 %    NRBCs per 100 WBC 0 <1 /100    Absolute Neutrophils 3.3 1.6 - 8.3 10e3/uL    Absolute Lymphocytes 2.2 0.8 - 5.3 10e3/uL     Absolute Monocytes 0.5 0.0 - 1.3 10e3/uL    Absolute Eosinophils 0.3 0.0 - 0.7 10e3/uL    Absolute Basophils 0.1 0.0 - 0.2 10e3/uL    Absolute Immature Granulocytes 0.0 <=0.4 10e3/uL    Absolute NRBCs 0.0 10e3/uL       ROS: 10 point ROS neg other than the symptoms noted above in the HPI.     I have reviewed the physical done by the ED provider on 7/9/2024. Notable changes include: none            Impression:   This patient is a 24 year old year old black male with a past psychiatric history of  one previous presentation to the ED for psychosis that was attributed to using mushrooms, who presented to the ED on 7/9/2024  for psychosis.  Prior to presenting to the ED he was expereincing AH/VH and paranoia.    Significant symptoms are noted in the HPI. There is genetic loading for none known.  Medical history does not appear to be significant.  Substance use does appear to be playing a contributing role in the patient's presentation. He has a history of using mushrooms and reports to drinking alcohol recently.  Major stressors are chronic mental health issues with recent exacerbation of symptoms including psychosis.  Patient appears to cope with stress/frustration/emotion by using substances and aggression.  Patient's support system includes family. Protective factors: family and engaged in treatment. Risk factors: maladaptive coping, substance use, impulsive, and past behaviors. Patient Strengths: help seeking, engaging with ED treatment team, and agreeable to voluntary admission.    Based on interview with patient and patient's guardian/parent, record review, conversations ED staff, P staff and ED attending, the patient meets criteria for unspecified psychosis.  Current medications are listed below.  Recommend continuing Zyprexa as ordered in the ED.  Acute inpatient stabilization is needed as indicated by ongoing psychosis and inability to function in the community and need for safety and stabilization.  .   Other recommendations are listed below.    Risk for harm is moderate-high.    Brief Therapeutic Intervention(s):   Provided rappport building, active listening, unconditional positive regard, and validation.    Legal Status: Voluntary           Diagnoses:   Principal Diagnosis: Unspecified psychosis     Secondary psychiatric diagnoses of concern this admission:  R/O schizophrenia/schizoaffective disorder vs bipolar with psychosis vs substance induced psychosis    Current medical diagnosis being treated:   none         Recommendations:     Discussed the case and writer's recommendations with Dr Roe Mckeon MD, ED provider.    Recommend acute inpatient stabilization based on ongoing psychosis and inability to function in the community and need for safety and stabilization.   2.   Recommend Continuing:    Zyprexa ODT 5 mg hs    Zyprexa IM/ODT 10 mg bid prn for aggression or agitation  .   Continue to consult psychiatry as needed.    Please call [facility]/DEC at [phone] if you have follow-up questions or wish to place another consult.         Attestation       Attestation:  Patient time: 25 minutes  Reviewed labs, EKG, and relevant imagin minutes  Family/guardian/other time: 0 minutes  Team time:15  Chart review: 30 minutes  Documentation: 40 minutes  Total time: 115 minutes spent on the date of the encounter.    I have provided critical care services at the bedside in the [facility] adult emergency department,  evaluating the patient, reviewing notes and laboratory values and directing care. I have discussed recommendation regarding whether or not hospitalization is needed and recommendations for medications and laboratory testing with the attending emergency department provider. Debi Giles, DNP, APRN, CNP July 10, 2024.    Disclaimer: This note consists of symbols derived from keyboarding, dictation, and/or voice recognition software. As a result, there may be errors in the script that have gone  undetected.  Please consider this when interpreting information found in the chart.

## 2024-07-10 NOTE — CONSULTS
"Diagnostic Evaluation Consultation  Crisis Assessment    Patient Name: Pepe Stafford  Age:  24 year old  Legal Sex: male  Gender Identity: male  Race: Black or   Ethnicity: Not  or   Language: English      Patient was assessed: In person   Crisis Assessment Start Date: 07/09/24  Crisis Assessment Start Time: 1930  Crisis Assessment Stop Time: 2000  Patient location: [facility] EMERGENCY DEPARTMENT                             URE-K    Referral Data and Chief Complaint  Pepe Stafford presents to the ED with family/friends (with mother). Patient is presenting to the ED for the following concerns: Paranoia, Other (see comment) (katja, psychosis).   Factors that make the mental health crisis life threatening or complex are:  Pt presents to the ED with his mother with concerns of katja and psychosis. Upon assessment, pt speaks barely above a whisper and requires several prompts in order for this writer to clearly understand him. He tells this writer that he has been extensively researching \"angelology\" and talks at length about various Sikhism references, including seraphims and cherubims. He endorses auditory and visual hallucinations. When asked about their content, pt tells this writer that the voices are talking to him about the Bible \"all the time\" as well as about \"[place]\" and \"apprenticeship\". He reports hearing \"[place] languages\" and says that he has been speaking to a ghost. He states that the voices are also sometimes negative in nature, such as telling him that his mother is being harmed. Pt tells this writer that he has also been experiencing \"visions\" of snakes, animals, crystals, and eggs. He has seen himself be eaten by a shark as well as someone stab him. Pt lives with his mother and exhibits significant paranoia toward their roommate. He believes that their roommate has been using her \"eyes for witchcraft\" to terrify his mother. He also thinks that she has " "potentially been coughing to hypnotize his mother. Pt believes that their roommate is trying to trick him and has been looking at him \"like a robot\". Pt notes that he did recently threaten their roommate as he was concerned for his mother's safety. He reports having a pending terroristic threat charge related to this with court on 7/24. He denies any HI toward his roommate or others at present. When asked about SI, pt reports thinking about going to [facility] but denies a plan or intent for suicide. Pt tells this writer that he has been sleeping and eating very minimally. When asked about substance use, he reports recently drinking a Four Loco and using psilocybin mushrooms earlier this year. He states that he is not using psilocybin mushrooms at present but did \"take a lot\" earlier this year. He is worried that he may have caused harm to himself from his psilocybin use. Pt tells this writer that he has \"no clue what is going on\" at present but that he is \"sane\" and that his only goal is to protect his mother and go back to work to provide for her. He notes that his family has been having difficulty affording rent and food lately..      Informed Consent and Assessment Methods  Explained the crisis assessment process, including applicable information disclosures and limits to confidentiality, assessed understanding of the process, and obtained consent to proceed with the assessment.  Assessment methods included conducting a formal interview with patient, review of medical records, collaboration with medical staff, and obtaining relevant collateral information from family and community providers when available.  : done     Patient response to interventions: acceptance expressed, verbalizes understanding  Coping skills were attempted to reduce the crisis:  Pt engaged in a conversation wiht this writer about his mental health.     History of the Crisis   Pt was seen in the ED at [facility] from 05/16-05/17/24 for " "psychosis. At that time, pt's presentation was suspected to be linked to recent psilocybin use. Chart review indicates that pt's family possibly has a history of psychosis. There is mention in the chart that pt's father has experienced similar symptoms.    Brief Psychosocial History  Family:  Single, Children no  Support System:  Parent(s)  Employment Status:  unemployed  Source of Income:  other (see comments) (family support)  Financial Environmental Concerns:  unable to afford rent/mortgage, unemployed, unable to afford food (Pt reports that his family has been having difficulty paying rent and purchasing food. He would like to work but has been unable to at present due to his mental health concerns.)  Current Hobbies:  group/social activities  Barriers in Personal Life:  mental health concerns    Significant Clinical History  Current Anxiety Symptoms:  anxious, racing thoughts, excessive worry  Current Depression/Trauma:  sense of doom, thoughts of death/suicide (endorses passive SI only at present)  Current Somatic Symptoms:  racing thoughts, excessive worry, anxious  Current Psychosis/Thought Disturbance:  auditory hallucinations, visual hallucinations (paranoid delusions)  Current Eating Symptoms:  loss of appetite  Chemical Use History:  Alcohol: Other (comments) (Pt's mother reports that he is using alcohol excessively. Pt tells this writer that he recently drank a Four Loco and vomited afterwards. He reports that he does not use alcohol routinely.)  Last Use::  (unknown last date of use)  Benzodiazepines: None  Opiates: None  Cocaine: None  Marijuana: None  Other Use: Other (comments) (psilocybin mushrooms)  Last Use::  (Pt reports that he used psilocybin mushrooms \"months ago\" and \"took a lot this year\". He worries that the psilocybin mushrooms may have affected his functioning.)   Past diagnosis:  No known past diagnosis  Family history:  Other (Chart review indicates a family history of substance use " "and suggests that pt's father may have also experienced psychosis.)  Past treatment:  No known formal treatment attempts  Details of most recent treatment:  Pt has no mental healthcare providers at present.  Other relevant history:  No other relevant history.       Collateral Information  Is there collateral information: Yes     Collateral information name, relationship, phone number:  Annette Stafford, mother, PH: [phone] (using )    What happened today: Annette's sister encouraged her to bring pt to the ED for worsening psychosis.     What is different about patient's functioning: Since June 2024, pt has been experiencing worsening psychosis. He has been experiencing auditory and visual hallucinatons on a daily basis. The voices have been telling him to do things, such as pace back and forth. He has been \"awake at all hours and not sleeping\". He has been making a lot of statements that do not make sense. Some of his talk has been referencing killing. Pt lives with his mother and, about two weeks ago, he threatened to kill their roommate.     Concern about alcohol/drug use: Pt is drinking alcohol excessively.     What do you think the patient needs: Pt needs hospitalization.      Has patient made comments about wanting to kill themselves/others: yes    If d/c is recommended, can they take part in safety/aftercare planning:  yes    Additional collateral information:  Annette is recommending that pt remain in the hospital for further care.     Risk Assessment  [place] Suicide Severity Rating Scale Full Clinical Version: 7/9/24  Suicidal Ideation  Q1 Wish to be Dead (Lifetime): Yes  Q2 Non-Specific Active Suicidal Thoughts (Lifetime): Yes  3. Active Suicidal Ideation with any Methods (Not Plan) Without Intent to Act (Lifetime): No  Q4 Active Suicidal Ideation with Some Intent to Act, Without Specific Plan (Lifetime): No  Q5 Active Suicidal Ideation with Specific Plan and Intent (Lifetime): No  Q6 " "Suicide Behavior (Lifetime): no     Suicidal Behavior (Lifetime)  Actual Attempt (Lifetime): No  Has subject engaged in non-suicidal self-injurious behavior? (Lifetime): No  Interrupted Attempts (Lifetime): No  Aborted or Self-Interrupted Attempt (Lifetime): No  Preparatory Acts or Behavior (Lifetime): No    [place] Suicide Severity Rating Scale Recent: 7/9/24  Suicidal Ideation (Recent)  Q1 Wished to be Dead (Past Month): yes  Q2 Suicidal Thoughts (Past Month): yes (Pt tells this writer that he has been having thoughts of \"going to heaven\" but denies a specific suicidal plan. Chart review from his ED visit at [facility] on 05/16/24 indicates that pt was also experiencing suicidal ideation at that time.)  Q3 Suicidal Thought Method: no  Q4 Suicidal Intent without Specific Plan: no  Q5 Suicide Intent with Specific Plan: no  Level of Risk per Screen: low risk  Intensity of Ideation (Recent)  Most Severe Ideation Rating (Past 1 Month): 2  Frequency (Past 1 Month): Daily or almost daily  Duration (Past 1 Month): 1-4 hours/a lot of time  Controllability (Past 1 Month): Easily able to control thoughts  Deterrents (Past 1 Month): Deterrents definitely stopped you from attempting suicide  Reasons for Ideation (Past 1 Month): Completely to end or stop the pain (You couldn't go on living with the pain or how you were feeling)  Suicidal Behavior (Recent)  Actual Attempt (Past 3 Months): No  Total Number of Actual Attempts (Past 3 Months): 0  Has subject engaged in non-suicidal self-injurious behavior? (Past 3 Months): No  Interrupted Attempts (Past 3 Months): No  Total Number of Interrupted Attempts (Past 3 Months): 0  Aborted or Self-Interrupted Attempt (Past 3 Months): No  Total Number of Aborted or Self-Interrupted Attempts (Past 3 Months): 0  Preparatory Acts or Behavior (Past 3 Months): No  Total Number of Preparatory Acts (Past 3 Months): 0    Environmental or Psychosocial Events: challenging interpersonal " relationships, excessive debt, poor finances  Protective Factors: Protective Factors: strong bond to family unit, community support, or employment    Does the patient have thoughts of harming others? Feels Like Hurting Others: yes  Previous Attempt to Hurt Others: no  Violence Threats in Past 6 Months: Pt reports that he is currently being charged with terroristic threats after threatening to harm his roommate. As part of pt's paranoia, he believes that his roommate may be causing harm to his mother, which prompted the threats.  Current Violence Plan or Thoughts: Pt denies current violent plans or thoughts.  Is the patient engaging in sexually inappropriate behavior?: no  Duty to warn initiated: no (No duty to warn needed at this time.)    Is the patient engaging in sexually inappropriate behavior?  no        Mental Status Exam   Affect: Constricted  Appearance: Appropriate  Attention Span/Concentration: Attentive  Eye Contact: Engaged    Fund of Knowledge: Appropriate   Language /Speech Content: Fluent  Language /Speech Volume: Soft  Language /Speech Rate/Productions: Hyperverbal, Pressured  Recent Memory: Variable  Remote Memory: Intact  Mood: Anxious  Orientation to Person: Yes   Orientation to Place: Yes  Orientation to Time of Day: Yes  Orientation to Date: Yes     Situation (Do they understand why they are here?): Answer (please comment) (partial understanding)  Psychomotor Behavior: Normal  Thought Content: Delusions, Hallucinations, Paranoia, Suicidal (passive SI only)  Thought Form: Tangential     Medication  Psychotropic medications:   Medication Orders - Psychiatric (From admission, onward)      Start     Dose/Rate Route Frequency Ordered Stop    07/09/24 2200  OLANZapine zydis (zyPREXA) ODT tab 5 mg         5 mg Oral AT BEDTIME 07/09/24 2030 07/09/24 2030  OLANZapine (zyPREXA) injection 10 mg         10 mg Intramuscular 2 TIMES DAILY PRN 07/09/24 2030 07/09/24 2029  OLANZapine zydis (zyPREXA)  "ODT tab 10 mg         10 mg Oral 2 TIMES DAILY PRN 07/09/24 2030               Current Care Team  Patient Care Team:  System, Provider Not In as PCP - General (Clinic)    Diagnosis  Patient Active Problem List   Diagnosis Code    Bipolar disorder, unspecified (H) F31.9    Psychosis (H) F29       Primary Problem This Admission  Active Hospital Problems    Bipolar disorder, unspecified (H) F31.9      Psychosis (H) F29    Clinical Summary and Substantiation of Recommendations   It is the recommendation of this writer that pt be admitted to an inpatient psychiatric unit on the basis of his current episode of katja with psychosis. Pt reports that he has been sleeping and eating minimally and extensively researching \"angelology\". He endorses experiencing AH/VH and believes that his roommate may be placing his mother at harm by hypnotizing her. When asked about SI, pt also shares with this writer that he has been thinking about \"going to heaven\". Pt is voluntary for inpatient hospitalization.    Severe psychiatric, behavioral or other comorbid conditions are appropriate for management at inpatient mental health as indicated by at least one of the following: Psychiatric Symptoms, Impaired impulse control, judgement, or insight, Symptoms of impact to function  Severe dysfunction in daily living is present as indicated by at least one of the following: Incapacitation because of grave disability, Extreme disruption in vegetative function, Extreme deterioration in social interactions, Complete inability to maintain any appropriate aspect of personal responsibility in any adult roles  Situation and expectations are appropriate for inpatient care: Around-the-clock medical and nursing care to address symptoms and initiate intervention is required, Voluntary treatment at lower level of care is not feasible, Patient management/treatment at lower level of care is not feasible or is inappropriate  Inpatient mental health services are " necessary to meet patient needs and at least one of the following: Specific condition related to admission diagnosis is present and judged likely to deteriorate in absence of treatment at proposed level of care      Patient coping skills attempted to reduce the crisis:  Pt engaged in a conversation wiht this writer about his mental health.    Disposition  Recommended disposition: Inpatient Mental Health        Reviewed case and recommendations with attending provider. Attending Name: Dr. Quiroz       Attending concurs with disposition: yes       Patient and/or validated legal guardian concurs with disposition:   yes       Final disposition:  inpatient mental health    Legal status on admission: Voluntary/Patient has signed consent for treatment    Assessment Details   Total duration spent with the patient: 30 min     CPT code(s) utilized: 50785 - Psychotherapy for Crisis - 60 (30-74*) min    LAXMI Mg, Psychotherapist  [facility] - Triage & Transition Services  Callback: [phone]

## 2024-07-11 ENCOUNTER — TELEPHONE (OUTPATIENT)
Dept: BEHAVIORAL HEALTH | Facility: CLINIC | Age: 24
End: 2024-07-11
Payer: MEDICAID

## 2024-07-11 PROCEDURE — 250N000013 HC RX MED GY IP 250 OP 250 PS 637: Performed by: PSYCHIATRY & NEUROLOGY

## 2024-07-11 RX ADMIN — OLANZAPINE 5 MG: 5 TABLET, ORALLY DISINTEGRATING ORAL at 22:10

## 2024-07-11 NOTE — TELEPHONE ENCOUNTER
R: [place]  Access Inpatient Bed Call Log 7/11/2024 5:00 PM   Intake has called facilities that have not updated their bed status within the last 12 hours.??         [facility]   [place]-- [facility]: @ cap per website.   [place]-- [facility]:  Posting 0 beds. [phone] ECT Tx Patients need to go through APS.   [place]-- [facility]: Posting 0 beds. ECT Tx offered.        [place]-- [facility]:  Posting 0 beds.   [facility]-- [facility]: Posting 0 beds. [phone] ECT Tx offered.   [place] -- [facility] @ Cap per website.   [place] -- [facility] Posting 0 beds. [phone]     [facility] -- [facility]: @ cap per website.   [place] -- [facility]:  Posting 0 beds. Low acuity only. [phone]      Pt remains on worklist pending appropriate bed availability.

## 2024-07-11 NOTE — TELEPHONE ENCOUNTER
R: [place]  Access Inpatient Bed Call Log  7/10/2024 11:35 PM  Intake has called facilities that have not updated their bed status within the last 12 hours.??      ADULTS:     *METRO  [place] -- [facility]: @ cap per website.  [place] -- [facility]:  @ Cap per website.   [place] -- [facility]: @ Cap per website.  [place] -- [facility]: @ Cap per website. - Per Mihir Cruz @ 11:35PM, they are full  [place] -- [facility]: @ Cap per website.  [facility] -- [facility]: @ Cap per website.  [place] -- [facility] beds @ Cap per website. Ages 18-28, Voluntary only, COVID test req'd, NO aggression, physical or sexual assault, violence hx or drug abuse, or psychosis. -Per Debi @ 11:42PM, 1 YA bed  [place] -- [facility]: @ Cap per website.  [facility] -- [facility]: @ cap per website.  [place] -- [facility]:  @ Posting 1 bed. - Per Krystle @ 11:36PM, they are at full capacity tonight     Pt remains on waitlist pending appropriate placement availability.

## 2024-07-11 NOTE — PROGRESS NOTES
"Triage & Transition Services, Extended Care     Therapy Progress Note    Patient: Pepe goes by \"Pepe,\" uses he/him pronouns  Date of Service: July 11, 2024  Site of Service: [facility] EMERGENCY DEPARTMENT                             ED07  Patient was seen yes  Mode of Assessment: Virtual: iPad    Presentation Summary: Exchange greeting with patient. Introduced self and role. Attempted to talk about what was going on and symptoms. Pt reported everything was just a \"big misunderstanding\" and pt would rather not talk about it. There were issues with a roommate and his mom. Attempted to explore coping skills with pt. Pt kept answers brief. Pt reported he has no concerns at this. Pt reported he has been resting and \"keeping to himself\". Pt did not answer questions about psychosis, auditory or visual hallucinations, instead pt just stared at writer. Pt shared his interests and \"keeping busy, smoke\". Pt reported he would just like to rest.    Therapeutic Intervention(s) Provided: Engaged in guided discovery, explored patient's perspectives and helped expand them through socratic dialogue., Reviewed healthy living that supports positive mental health, including looking at sleep hygiene, regular movement, nutrition, and regular socialization., Provided positive reinforcement for progress towards goals, gains in knowledge, and application of skills previously taught.    Current Symptoms:  (Pt would not engage) avoidance (Pt would not engage)  (Pt would not engage) displaces blame, inattentive, high risk behavior (Pt would not engage)      Mental Status Exam   Affect: Constricted  Appearance: Appropriate  Attention Span/Concentration: Other (please comment) (variable)  Eye Contact: Variable    Fund of Knowledge: Appropriate   Language /Speech Content: Fluent  Language /Speech Volume: Soft  Language /Speech Rate/Productions: Minimally Responsive  Recent Memory: Variable  Remote Memory: Intact  Mood: " Apathetic  Orientation to Person: Yes   Orientation to Place: Yes  Orientation to Time of Day: Yes  Orientation to Date: Yes     Situation (Do they understand why they are here?): Answer (please comment) (Pt reported he is here due to a misunderstanding.)  Psychomotor Behavior: Normal  Thought Content:  (unable to assess)  Thought Form:  (unable to assess)    Treatment Objective(s) Addressed: rapport building, orienting the patient to therapy, assessing safety, identifying and practicing coping strategies, processing feelings, exploring obstacles to safety in the community, building distress tolerance    Patient Response to Interventions: refused to respond    Progress Towards Goals: Patient Reports Symptoms Are:  (unknown)  Patient Progress Toward Goals: other  Comment: Unable to assess pt as he would not talk about symptomology  Next Step to Work Toward Discharge: engaging in safety planning with collateral sources    Case Management: Case Management Included: collaborating with patient's support system  Details on Collaborating with Patient's Support System: Spoke with RN: Aj Lora  Summary of Interaction: Per RN: Pt is keeping to himself in the room. No behavioral concerns    Plan: inpatient mental health  yes provider Dr. Gonzalez  yes    Clinical Substantiation: Recommendation for inpatient mental health does not change at this time. Pt is guarded and would not talk about his symptomology. Minimally responded to prmopts from writer. Blames his mother and roommate for pt being in the ED. Pt remains to be voluntary for inpatient mental health.    Legal Status: Legal Status at Admission: Voluntary/Patient has signed consent for treatment    Session Status: Time session started: 0245  Time session ended: 0301  Session Duration (minutes): 16 minutes  Session Number: 1  Anticipated number of sessions or this episode of care: 4    Time Spent: 16 minutes    CPT Code: CPT Codes: 21806 - Psychotherapy (with patient) - 30  (16-37*) min    Diagnosis:   Patient Active Problem List   Diagnosis Code    Bipolar disorder, unspecified (H) F31.9    Psychosis (H) F29       Primary Problem This Admission: Active Hospital Problems    Bipolar disorder, unspecified (H)      Psychosis (H)        LAXMI Mcmillan   Licensed Mental Health Professional (LMHP), Extended Care  [phone]

## 2024-07-12 ENCOUNTER — TELEPHONE (OUTPATIENT)
Dept: BEHAVIORAL HEALTH | Facility: CLINIC | Age: 24
End: 2024-07-12
Payer: MEDICAID

## 2024-07-12 PROCEDURE — 250N000013 HC RX MED GY IP 250 OP 250 PS 637: Performed by: PSYCHIATRY & NEUROLOGY

## 2024-07-12 RX ADMIN — OLANZAPINE 5 MG: 5 TABLET, ORALLY DISINTEGRATING ORAL at 22:36

## 2024-07-12 NOTE — TELEPHONE ENCOUNTER
R: [place]  Access Inpatient Bed Call Log  7/12/2024 12:11 AM  Intake has called facilities that have not updated their bed status within the last 12 hours.??      ADULTS:     *METRO  [place] -- [facility]: @ cap per website.  [place] -- [facility]:  @ Cap per website.   [place] -- [facility]: @ Cap per website.  [place] -- [facility]: @ Cap per website. - 12:20 AM Per Mykel, they are able to review. Called again @ 12:40AM and Mykel reports they are now full as they are getting an admit  [place] -- [facility]: @ Cap per website.  [facility] -- [facility]: @ Cap per website.  [place] -- [facility] @ Posting 1 beds. Ages 18-28, Voluntary only, COVID test req'd, NO aggression, physical or sexual assault, violence hx or drug abuse, or psychosis - Declined 7/10 d/t needing higher level of care  [place] -- [facility]: @ Cap per website.  [facility] -- [facility]: @ cap per website.  [place] -- [facility]:  @ Cap per website.    Pt remains on waitlist pending appropriate placement availability.

## 2024-07-12 NOTE — ED NOTES
Pt went to the shower last night with 2 staff members and security; no events to note. Pt changed his clothes and bed after the shower; pt's clothes in the wash. Pt was respectful, calm, and cooperative all shift. No events of note. Pt is currently sleeping.

## 2024-07-13 ENCOUNTER — TELEPHONE (OUTPATIENT)
Dept: BEHAVIORAL HEALTH | Facility: CLINIC | Age: 24
End: 2024-07-13
Payer: MEDICAID

## 2024-07-13 PROCEDURE — 250N000013 HC RX MED GY IP 250 OP 250 PS 637: Performed by: PSYCHIATRY & NEUROLOGY

## 2024-07-13 RX ADMIN — OLANZAPINE 5 MG: 5 TABLET, ORALLY DISINTEGRATING ORAL at 21:43

## 2024-07-13 NOTE — TELEPHONE ENCOUNTER
R: [place]  Access Inpatient Bed Call Log 7/13/2024 12:00 PM   Intake has called facilities that have not updated their bed status within the last 12 hours.??         *[facility]RO   [place]-- [facility]: @ cap per website.   [place]-- [facility]:  Posting 8 beds. [phone] ECT Tx Patients need to go through APS. Per call @ 12:33 pm to APS intake, pt is considered higher acuity and would need an ICU bed which they do not have available at the moment.     [place]-- [facility]: Posting 0 beds. ECT Tx offered.        [place]-- [facility]:  Posting 0 beds.   [facility]-- [facility]: Posting 0 beds. [phone] ECT Tx offered.   [place] -- [facility] @ Cap per website.  [place] -- [facility] Posting 0 beds. [phone]     [facility] -- [facility]: @ cap per website.   [place] -- [facility]:  Posting 0 beds. Low acuity only. [phone]      Pt remains on worklist pending appropriate bed availability.

## 2024-07-13 NOTE — ED PROVIDER NOTES
[facility] ED Mental Health Handoff Note:       Brief HPI:  This is a 24 year old male signed out to me.  See initial ED Provider note for full details of the presentation. Interval history is pertinent for patient awaiting psychiatric bed assignment, ED boarding.    Home meds reviewed and ordered/administered: Yes    Medically stable for inpatient mental health admission: Yes.    Evaluated by mental health: Yes. The recommendation is for inpatient mental health treatment. Bed search in process    Safety concerns: At the time I received sign out, there were no safety concerns.    Hold Status:  Active Orders   N/A            Exam:   Patient Vitals for the past 24 hrs:   BP Pulse SpO2   07/12/24 2240 -- 67 98 %   07/12/24 2235 129/63 -- --           ED Course:    Medications   OLANZapine zydis (zyPREXA) ODT tab 10 mg ( Oral Canceled Entry 7/10/24 1059)   OLANZapine (zyPREXA) injection 10 mg (has no administration in time range)   OLANZapine zydis (zyPREXA) ODT tab 5 mg (5 mg Oral $Given 7/12/24 2236)            There were no significant events during my shift.    Patient was signed out to the oncoming provider.      Impression:    ICD-10-CM    1. Acute psychosis (H)  F23       2. Substance abuse (H)  F19.10           Plan:    Awaiting inpatient mental health admission/transfer.      RESULTS:   No results found for this visit on 07/09/24 (from the past 24 hour(s)).          MD Bandar Avery, MD James  07/13/24 9610

## 2024-07-13 NOTE — TELEPHONE ENCOUNTER
R:  [place]  Access Inpatient Bed Call Log 7/13/24 @ 12AM:  Intake has called facilities that have not updated their bed status within the last 12 hours.    [facility]     [facility]  [facility]: Posting 8 beds. Called @ 12:47AM, LV re: bed availability    [facility]: @ cap per website   [facility]: @ cap per website. Per Cheri @ 12:48AM, they are at capacity   [facility]: @ cap per website   [facility]: @ cap per website   [facility]: @ cap per website (Young Adult unit: No recent aggressions, violence or sexual assault. Neg covid required).     [facility]: @ cap per website   [facility]: @ cap per website   [facility]: @ cap per website    Pt remains on work list until appropriate placement is available

## 2024-07-13 NOTE — TELEPHONE ENCOUNTER
R: [place]  Access Inpatient Bed Call Log 7/13/24 @3:10 PM:     Intake has called facilities that have not updated the bed status within the last 12 hours.                                 [facility] is posting 0 beds.           [facility] is posting 8 beds. [phone];    Pt declined earlier due to acuity  [facility] is posting 0 beds. Negative covid required    [facility] is posting 0 beds. Neg covid. No high school/Irish-psych. [phone]. per call at 7:07 am to [place], they are at cap for the day.    [facility] is posting 0 beds. [phone]    [facility] is posting 0 beds. [phone]     [facility] is posting 0 beds. Ages 18-35. Negative covid. [phone]. Per call at 7:08 am, left vm asking for a call back re: bed avail.    [facility] is posting 0 beds.     [facility] is posting 0 beds [phone]         Pt remains on the work list pending appropriate bed availability.

## 2024-07-14 ENCOUNTER — TELEPHONE (OUTPATIENT)
Dept: BEHAVIORAL HEALTH | Facility: CLINIC | Age: 24
End: 2024-07-14
Payer: MEDICAID

## 2024-07-14 PROCEDURE — 250N000013 HC RX MED GY IP 250 OP 250 PS 637: Performed by: PSYCHIATRY & NEUROLOGY

## 2024-07-14 RX ADMIN — OLANZAPINE 5 MG: 5 TABLET, ORALLY DISINTEGRATING ORAL at 22:16

## 2024-07-14 NOTE — TELEPHONE ENCOUNTER
R: [place]  Access Inpatient Bed Call Log 7/14/2024 1:28 PM   Intake has called facilities that have not updated their bed status within the last 12 hours.??         [facility]   [place]: @ cap per website.   [place]:  Posting 8 beds. [phone] ECT Tx Patients need to go through APS. Per 7 am call to Lu[facility], they are at cap.  [place]: Posting 0 beds. ECT Tx offered.        [place]:  Posting 0 beds.   [facility]: Posting 0 beds. [phone] ECT Tx offered.   [place] -- [facility] @ Cap per website.  [place] [facility] Posting 0 beds. [phone]     [place] -- [facility]: @ cap per website.   [place] -- [facility]:  Posting 0 beds. Low acuity only. [phone]      Pt remains on worklist pending appropriate bed availability.

## 2024-07-14 NOTE — ED NOTES
"On assessment, pt stated nightmares have become more vivid over the last few nights. Pt reports still hearing voices that are stating random numbers. Pt states \"The voices are quieter but I know they're there.\"   "

## 2024-07-14 NOTE — ED PROVIDER NOTES
[facility] ED Mental Health Handoff Note:       Brief HPI:  This is a 24 year old male signed out to me by Dr. Uribe.  See initial ED Provider note for full details of the presentation. Interval history is pertinent for no acute issues.  Patient is calm and cooperative.  Sitting comfortably in the ER.  Patient is being admitted for for psychotic episode.  He is voluntary.  Patient been boarding in the ER for approximately 112 hours..    Home meds reviewed and ordered/administered: Yes    Medically stable for inpatient mental health admission: Yes.    Evaluated by mental health: Yes. The recommendation is for inpatient mental health treatment. Bed search in process    Safety concerns: At the time I received sign out, there were no safety concerns.    Hold Status:  Active Orders   N/A            Exam:   Patient Vitals for the past 24 hrs:   BP Temp Temp src Pulse Resp SpO2   07/13/24 2140 134/67 98.1  F (36.7  C) Oral 70 16 99 %   07/13/24 1559 119/76 -- -- -- 14 98 %   07/13/24 1500 -- -- -- -- -- 98 %           ED Course:    Medications   OLANZapine zydis (zyPREXA) ODT tab 10 mg ( Oral Canceled Entry 7/10/24 1059)   OLANZapine (zyPREXA) injection 10 mg (has no administration in time range)   OLANZapine zydis (zyPREXA) ODT tab 5 mg (5 mg Oral $Given 7/13/24 2143)            There were no significant events during my shift.    Patient was signed out to the oncoming provider.      Impression:    ICD-10-CM    1. Acute psychosis (H)  F23       2. Substance abuse (H)  F19.10           Plan:    Awaiting inpatient mental health admission/transfer.      RESULTS:   No results found for this visit on 07/09/24 (from the past 24 hour(s)).          MD Jojo Hudson, Ivon Stewart MD  07/14/24 8691

## 2024-07-14 NOTE — TELEPHONE ENCOUNTER
R:  [place]  Access Inpatient Bed Call Log 7/13/24 @ 11:45PM  Intake has called facilities that have not updated their bed status within the last 12 hours.    [facility]     [facility]: @ cap  [facility]: Posting 8 beds.  Per Dc @ 11:50PM, they are full. Reviewed 7/13 and reported pt needs ICU bed   [facility]: @ cap per website   [facility]: @ cap per website   [facility]: @ cap per website   [facility]: @ cap per website   [facility]: @ cap per website (Young Adult unit: No recent aggressions, violence or sexual assault. Neg covid required).  Per Ángel @ 12:24AM, no YA beds.  Declined 7/10 d/t needing higher level of care   [facility]: @ cap per website   [place]: @ cap per website   [facility]: @ cap per website     Pt remains on work list until appropriate placement is available

## 2024-07-15 ENCOUNTER — TELEPHONE (OUTPATIENT)
Dept: BEHAVIORAL HEALTH | Facility: CLINIC | Age: 24
End: 2024-07-15
Payer: MEDICAID

## 2024-07-15 PROCEDURE — 99233 SBSQ HOSP IP/OBS HIGH 50: CPT | Performed by: CLINICAL NURSE SPECIALIST

## 2024-07-15 PROCEDURE — 250N000013 HC RX MED GY IP 250 OP 250 PS 637: Performed by: CLINICAL NURSE SPECIALIST

## 2024-07-15 PROCEDURE — 99418 PROLNG IP/OBS E/M EA 15 MIN: CPT | Performed by: CLINICAL NURSE SPECIALIST

## 2024-07-15 RX ORDER — OLANZAPINE 10 MG/1
10 TABLET, ORALLY DISINTEGRATING ORAL AT BEDTIME
Status: DISCONTINUED | OUTPATIENT
Start: 2024-07-15 | End: 2024-07-17

## 2024-07-15 RX ORDER — POLYETHYLENE GLYCOL 3350 17 G/17G
17 POWDER, FOR SOLUTION ORAL DAILY
Status: DISCONTINUED | OUTPATIENT
Start: 2024-07-15 | End: 2024-08-05 | Stop reason: HOSPADM

## 2024-07-15 RX ADMIN — POLYETHYLENE GLYCOL 3350 17 G: 17 POWDER, FOR SOLUTION ORAL at 17:30

## 2024-07-15 RX ADMIN — OLANZAPINE 10 MG: 10 TABLET, ORALLY DISINTEGRATING ORAL at 22:20

## 2024-07-15 ASSESSMENT — ACTIVITIES OF DAILY LIVING (ADL)
ADLS_ACUITY_SCORE: 35
CONCENTRATING,_REMEMBERING_OR_MAKING_DECISIONS_DIFFICULTY: YES
WALKING_OR_CLIMBING_STAIRS_DIFFICULTY: NO
TOILETING_ISSUES: NO
ADLS_ACUITY_SCORE: 35
DRESSING/BATHING_DIFFICULTY: NO
ADLS_ACUITY_SCORE: 29
ADLS_ACUITY_SCORE: 35
WEAR_GLASSES_OR_BLIND: NO
ADLS_ACUITY_SCORE: 35
FALL_HISTORY_WITHIN_LAST_SIX_MONTHS: NO
ADLS_ACUITY_SCORE: 35
DIFFICULTY_EATING/SWALLOWING: NO
CHANGE_IN_FUNCTIONAL_STATUS_SINCE_ONSET_OF_CURRENT_ILLNESS/INJURY: NO
DIFFICULTY_COMMUNICATING: NO
ADLS_ACUITY_SCORE: 35
ADLS_ACUITY_SCORE: 45
ADLS_ACUITY_SCORE: 35
HEARING_DIFFICULTY_OR_DEAF: NO
DOING_ERRANDS_INDEPENDENTLY_DIFFICULTY: NO
ADLS_ACUITY_SCORE: 35

## 2024-07-15 NOTE — PROGRESS NOTES
"Triage & Transition Services, Extended Care     Therapy Progress Note    Patient: Pepe goes by \"Pepe,\" uses he/him pronouns  Date of Service: July 15, 2024  Site of Service: [facility] EMERGENCY DEPARTMENT                             ED07  Patient was seen yes  Mode of Assessment: Virtual: iPad    Presentation Summary: Pt reported that he is hearing voices.  He stated that he recently heard \"I see rainbows.\"  Pt stated that sometimes he sees a rainbow on his phone.  He stated hat when he did mushrooms, he saw rainbows.  Pt stated that he is going through some things and it is hard to function.  He described talking in tongues to family.  He stated that he saw a symbol of an owl and it's trying to take him to [place].  He also stated that he sees ancient symptoms in his head.  Pt stated that his brain hurts.  He claimed that these symptoms started \"out of the middle of nowhere.\"  Pt stated that there are a lot of things preventing him from doing what he wants to do.  He stated that there are organizations involved and they are attempting to hypnotize people.  Pt reported that his mother is frustrated becase she has \"assignments of her own.\"  He stated that his assignment is to speak to Hamzha, who is in [place], in some intelligence agency.  Pt stated that he is currently hearing a voice saying \"no one wants you.\"  Pt denied depression and stated \"it's more like confusion.\"  Pt stated that previously he had a time when he thought he killed himself by accident.  He stated that he saw himself in the mirror as gray and then there was a purple mist.  He reported having a vision of himself being eaten by a shark.  Pt also stated that he has been hearing Bible verses being recited.  He stated, \"I don't study the Bible.  I don't believe the Bible.\"  Pt stated that he feels \"off.\"  He stated that he wants to get an inpatient bed so he can get better and feel more stable.    Therapeutic Intervention(s) Provided: " Engaged in cognitive restructuring/ reframing, looked at common cognitive distortions and challenged negative thoughts., Coached on coping techniques/relaxation skills to help improve distress tolerance and managing intense emotions.    Current Symptoms:  somatic symptoms (abdominal pain, headache, tension) auditory hallucinations, visual hallucinations      Mental Status Exam   Affect: Appropriate  Appearance: Appropriate  Attention Span/Concentration: Attentive  Eye Contact: Engaged    Fund of Knowledge: Appropriate   Language /Speech Content: Fluent  Language /Speech Volume: Normal  Language /Speech Rate/Productions: Normal  Recent Memory: Variable  Remote Memory: Intact  Mood: Normal  Orientation to Person: Yes   Orientation to Place: Yes  Orientation to Time of Day: Yes  Orientation to Date: No (July 11)     Situation (Do they understand why they are here?): Yes  Psychomotor Behavior: Normal  Thought Content: Delusions, Hallucinations  Thought Form: Goal Directed    Treatment Objective(s) Addressed: rapport building, processing feelings, building distress tolerance, assessing safety    Patient Response to Interventions: verbalizes understanding    Progress Towards Goals: Patient Reports Symptoms Are: ongoing  Patient Progress Toward Goals: is not making progress  Comment: Pt reported auditory and visual hallucinations.  Next Step to Work Toward Discharge: symptom stabilization    Case Management: None    Plan: inpatient mental health  yes provider Dr. Davis  yes    Clinical Substantiation: Recommendation remains inpatient mental health.  Pt reported auditory and visual hallucinations.  He had delusional thoughts.  Pt appeared to be responding to internal stimuli at times with inappropriate affect.  He admitted to feeling off and needing help.    Legal Status: Legal Status at Admission: Voluntary/Patient has signed consent for treatment    Session Status: Time session started: 7838  Time session ended:  1327  Session Duration (minutes): 16 minutes  Session Number: 2  Anticipated number of sessions or this episode of care: 4    Time Spent: 16 minutes    CPT Code: CPT Codes: 40190 - Psychotherapy (with patient) - 30 (16-37*) min    Diagnosis:   Patient Active Problem List   Diagnosis Code    Bipolar disorder, unspecified (H) F31.9    Psychosis (H) F29       Primary Problem This Admission: Active Hospital Problems    Bipolar disorder, unspecified (H) F31.9      Psychosis (H) F29        Tracy Mcallister LP   Licensed Mental Health Professional (LMHP), [facility]  [phone]

## 2024-07-15 NOTE — ED NOTES
IP MH Referral Acuity Rating Score (RARS)    LMHP complete at referral to IP MH, with DEC; and, daily while awaiting IP MH placement. Call score to PPS.  CRITERIA SCORING   New 74 HH and Involuntary for IP MH (not adolescent) 0/1   Boarding over 24 hours 1/1   Vulnerable adult at least 55+ with multiple co morbidities; or, Patient age 11 or under 0/1   Suicide ideation without relief of precipitating factors 0/1   Current plan for suicide 0/1   Current plan for homicide 0/1   Imminent risk or actual attempt to seriously harm another without relief of factors precipitating the attempt 0/1   Severe dysfunction in daily living (ex: complete neglect for self care, extreme disruption in vegetative function, extreme deterioration in social interactions) 1/1   Recent (last 2 weeks) or current physical aggression in the ED 0/1   Restraints or seclusion episode in ED 0/1   Verbal aggression, agitation, yelling, etc., while in the ED 0/1   Active psychosis with psychomotor agitation or catatonia 1/1   Need for constant or near constant redirection (from leaving, from others, etc).  0/1   Intrusive or disruptive behaviors 0/1   TOTAL Acuity Total Score: 3

## 2024-07-15 NOTE — TELEPHONE ENCOUNTER
R: [place]  Access Inpatient Bed Call Log 7/15/24  @ 0830  Intake has called facilities that have not updated the bed status within the last 12 hours.                                 [facility] is posting 0 beds.           [facility] is posting  3  beds. [phone];    [facility] is posting 0 beds. Negative covid required    [facility] is posting 0 beds. Neg covid. No high school/Irish-psych. [phone].      [facility] is posting 0 beds. [phone]    [facility] is posting 0 beds. [phone]     [facility] is posting 0 beds. Ages 18-35. Negative covid. [phone].   - Declined due to acuity  [facility] is posting 0 beds.     [facility] is posting 0 beds [phone]         Pt remains on the work list pending appropriate bed availability.

## 2024-07-15 NOTE — TELEPHONE ENCOUNTER
R: [place]  Access Inpatient Bed Call Log 7/14/24  @3:10 PM:    Intake has called facilities that have not updated the bed status within the last 12 hours.                                 [facility] is posting 0 beds.           [facility] is posting 8 beds. [phone];  per call at 7 am to [facility], they are at cap.    [facility] is posting 0 beds. Negative covid required    [facility] is posting 0 beds. Neg covid. No high school/Irish-psych. [phone].  per call at 7:02 am to [facility], they are at cap.    [facility] is posting 0 beds. [phone]    [facility] is posting 0 beds. [phone]     [facility] is posting 0 beds. Ages 18-35. Negative covid. [phone]. Per call at 7:03 am, left vm asking for a call back re: bed availability.    [facility] is posting 0 beds.     [facility] is posting 0 beds [phone]         Pt remains on the work list pending appropriate bed availability.

## 2024-07-15 NOTE — TELEPHONE ENCOUNTER
R:  [place]  Access Inpatient Bed Call Log 7/14/24 @ 11:35PM  Intake has called facilities that have not updated their bed status within the last 12 hours.    [facility]     [facility] cap  [facility]: Posting 3 beds. Per Kaykay @ 11:35PM, they are at capacity. Reviewed 7/13 and reported pt needs ICU bed   [facility]: @ cap per website   [facility]: @ cap per website.  Per Cheri @ 11:36PM, they are full tonight  [facility]: @ cap per website   [facility]: @ cap per website   [facility]: @ cap per website (Young Adult unit: No recent aggressions, violence or sexual assault. Neg covid required). Per Debi @ 12:04AM, no YA beds. Declined 7/10 d/t needing higher level of care     [facility]: @ cap per website   [facility]: @ cap per website   [facility]: @ cap per website     Pt remains on work list until appropriate placement is available

## 2024-07-15 NOTE — TELEPHONE ENCOUNTER
R: [place]  Access Inpatient Bed Call Log 7/15/24  @4:00 PM:  Intake has called facilities that have not updated the bed status within the last 12 hours.                                           [facility] is posting 0 beds.                     [facility] is posting  3  beds. [phone];              [facility] is posting 0 beds. Negative covid required              [facility] is posting 0 beds. Neg covid. No high school/Irish-psych. [phone].                [facility] is posting 0 beds. [phone]              [facility] is posting 0 beds. [phone]               [facility] is posting 0 beds. Ages 18-35. Negative covid. [phone].               [facility] is posting 0 beds.               [facility] is posting 0 beds [phone]    6:30 PM Paged Dr. Lutz to review for 6A/ [facility].    7:58 PM Re- paged provider to review for 6A/ Karlene.    8:11 PM Pt accepted to 6A/ [facility].    8:28 PM Admit board updated, report info exchanged.    8:53 PM Pt added to the queue.    9:23 PM Indicia completed.

## 2024-07-15 NOTE — ED NOTES
Emergency Department Patient Sign-out       Brief HPI:  This is a 24 year old male signed out to me by Dr. Paiz .  See initial ED Provider note for details of the presentation.            Significant Events prior to my assuming care: n/a      Exam:   Patient Vitals for the past 24 hrs:   BP Temp Temp src Pulse SpO2   07/15/24 0655 -- -- -- -- 99 %   07/15/24 0650 111/77 97.8  F (36.6  C) Oral 76 --       General: Patient is in no acute distress currently.  HEENT: Normocephalic atraumatic.    Neck: Supple  Pulmonary: Patient is in no respiratory distress  Extremities: No signs of any significant or life-threatening trauma.  Neurologic: No new focal neurologic deficits.   Psych: Patient pleasant and with no complaints    ED RESULTS:   No results found for this visit on 07/09/24 (from the past 24 hour(s)).    ED MEDICATIONS:   Medications   OLANZapine zydis (zyPREXA) ODT tab 10 mg ( Oral Canceled Entry 7/10/24 1059)   OLANZapine (zyPREXA) injection 10 mg (has no administration in time range)   OLANZapine zydis (zyPREXA) ODT tab 10 mg (has no administration in time range)         Impression:    ICD-10-CM    1. Acute psychosis (H)  F23       2. Substance abuse (H)  F19.10           Plan:    Pending studies include n/a.    Patient continued to be pending for admission      MD Ryan Melton Collin, MD  07/15/24 0360
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
## 2024-07-16 PROBLEM — F19.10 SUBSTANCE ABUSE (H): Status: ACTIVE | Noted: 2024-07-16

## 2024-07-16 PROBLEM — F23 ACUTE PSYCHOSIS (H): Status: ACTIVE | Noted: 2024-07-16

## 2024-07-16 PROCEDURE — G0177 OPPS/PHP; TRAIN & EDUC SERV: HCPCS

## 2024-07-16 PROCEDURE — 90853 GROUP PSYCHOTHERAPY: CPT | Performed by: PSYCHOLOGIST

## 2024-07-16 PROCEDURE — 250N000013 HC RX MED GY IP 250 OP 250 PS 637: Performed by: REGISTERED NURSE

## 2024-07-16 PROCEDURE — 90853 GROUP PSYCHOTHERAPY: CPT

## 2024-07-16 PROCEDURE — 99223 1ST HOSP IP/OBS HIGH 75: CPT | Mod: AI | Performed by: REGISTERED NURSE

## 2024-07-16 PROCEDURE — 250N000013 HC RX MED GY IP 250 OP 250 PS 637: Performed by: CLINICAL NURSE SPECIALIST

## 2024-07-16 PROCEDURE — 128N000002 HC R&B CD/MH ADOLESCENT

## 2024-07-16 RX ORDER — ACETAMINOPHEN 325 MG/1
650 TABLET ORAL EVERY 4 HOURS PRN
Status: DISCONTINUED | OUTPATIENT
Start: 2024-07-16 | End: 2024-08-05 | Stop reason: HOSPADM

## 2024-07-16 RX ORDER — QUETIAPINE FUMARATE 50 MG/1
50 TABLET, FILM COATED ORAL
Status: DISCONTINUED | OUTPATIENT
Start: 2024-07-16 | End: 2024-07-30

## 2024-07-16 RX ORDER — OLANZAPINE 5 MG/1
5 TABLET, ORALLY DISINTEGRATING ORAL DAILY
Status: DISCONTINUED | OUTPATIENT
Start: 2024-07-16 | End: 2024-07-17

## 2024-07-16 RX ORDER — ACETAMINOPHEN 650 MG/1
650 SUPPOSITORY RECTAL EVERY 4 HOURS PRN
Status: DISCONTINUED | OUTPATIENT
Start: 2024-07-16 | End: 2024-08-05 | Stop reason: HOSPADM

## 2024-07-16 RX ORDER — SENNOSIDES 8.6 MG
8.6 TABLET ORAL 2 TIMES DAILY PRN
Status: DISCONTINUED | OUTPATIENT
Start: 2024-07-16 | End: 2024-08-05 | Stop reason: HOSPADM

## 2024-07-16 RX ADMIN — OLANZAPINE 5 MG: 5 TABLET, ORALLY DISINTEGRATING ORAL at 14:32

## 2024-07-16 RX ADMIN — OLANZAPINE 10 MG: 10 TABLET, ORALLY DISINTEGRATING ORAL at 20:36

## 2024-07-16 ASSESSMENT — ACTIVITIES OF DAILY LIVING (ADL)
ADLS_ACUITY_SCORE: 29

## 2024-07-16 NOTE — PROGRESS NOTES
"   07/15/24 0580   Patient Belongings   Did you bring any home meds/supplements to the hospital?  No   Patient Belongings locker;sent to security per site process   Patient Belongings Put in Hospital Secure Location (Security or Locker, etc.) cell phone/electronics;purse/wallet;other (see comments);clothing   Belongings Search Yes   Clothing Search Yes   Second Staff [place]       Sent to Security (envelope [phone])  Birth certificate  Social security card  [place] Identification card  [employer] debit card [phone]  [place] debit card [phone]  [place] card    In locker  Brown Wallet    Andriod phone  Cell phone   Earbuds  Black \"avalanche\" brand boots  Black hoodie with skull and roses  Black jeans  Green and grey socks  Grey boxers  Black kimani    ..A               Admission:  I am responsible for any personal items that are not sent to the safe or pharmacy.  [facility] is not responsible for loss, theft or damage of any property in my possession.    Signature:  _________________________________ Date: _______  Time: _____                                              Staff Signature:  ____________________________ Date: ________  Time: _____      2nd Staff person, if patient is unable/unwilling to sign:    Signature: ________________________________ Date: ________  Time: _____     Discharge:  [facility] has returned all of my personal belongings:    Signature: _________________________________ Date: ________  Time: _____                                          Staff Signature:  ____________________________ Date: ________  Time: _____          "

## 2024-07-16 NOTE — PLAN OF CARE
"Patient Self-Assessment: Pt was given and completed a written self-assessment form. OT staff reviewed with pt and explained the value of having them involved in their treatment plan, and provided options to meet current needs/self-identified goals.     What do you do during the day/evening? \"Music, walk, work\"    What stressors do you face that trigger symptoms/substance use? \"All\"    Who are supportive people you enjoy spending time with? \"Positive role models so mom\"    What are your positive qualities? \"Adaptive\"    What helps you to calm down or relax? \"Music\"    Coping Skills you'd like to explore in OT:    x Guided Relaxation / Meditation   x Physical Wellness / Exercise / Yoga / Sadiq Chi    Cooking / Baking (may not be an offered group)   x Journaling / Coloring / Drawing   x Arts & Crafts   x Group Games   x Mental Health Management / Discussion    Others:      What are your goals for when you leave the hospital?     Engage in OT group activities that support recovery    x Work on self-cares to improve wellness    Practice using coping strategies to manage stress and reduce symptoms    Participate in healthy, meaningful leisure activities    Share thoughts and/or feelings on mental health or substance use   x Improve focus and concentration during tasks    Communicate needs effectively    Increase confidence and self-esteem    Other:      PLAN: Will provide structure, support, and encouragement. Offer education on coping strategies and life management skills. Assist pt to increase self awareness regarding mental health issues. Will assess further in the areas of organization, problem solving, and concentration.     "

## 2024-07-16 NOTE — PLAN OF CARE
BEH IP Unit Acuity Rating Score (UARS)  Patient is given one point for every criteria they meet.    CRITERIA SCORING   On a 72 hour hold, court hold, committed, stay of commitment, or revocation. 0    Patient LOS on BEH unit exceeds 20 days. 0  LOS: 0   Patient under guardianship, 55+, otherwise medically complex, or under age 11. 0   Suicide ideation without relief of precipitating factors. 0   Current plan for suicide. 0   Current plan for homicide. 0   Imminent risk or actual attempt to seriously harm another without relief of factors precipitating the attempt. 0   Severe dysfunction in daily living (ex: complete neglect for self care, extreme disruption in vegetative function, extreme deterioration in social interactions). 1   Recent (last 7 days) or current physical aggression in the ED or on unit. 0   Restraints or seclusion episode in past 72 hours. 0   Recent (last 7 days) or current verbal aggression, agitation, yelling, etc., while in the ED or unit. 0   Active psychosis. 1   Need for constant or near constant redirection (from leaving, from others, etc).  0   Intrusive or disruptive behaviors. 0   Patient requires 3 or more hours of individualized nursing care per 8-hour shift (i.e. for ADLs, meds, therapeutic interventions). 0   TOTAL 2

## 2024-07-16 NOTE — PLAN OF CARE
Goal Outcome Evaluation:    Plan of Care Reviewed With: patient      Pt is voluntary, he is on elopement and assault precautions.      He presents as calm on approach. Pt endorses anxiety, depression, as well as audio and visual hallucinations.  Denies SI, SIB, HI and contracts to be safe on the unit.     He endorses some neck pain, but declined medication when offered. Pt attended groups and spent some time reading a book in his room.    Pt is compliant w/ taking medication and tolerates a regular diet.

## 2024-07-16 NOTE — PLAN OF CARE
"Problem: Psychotic Symptoms  Goal: Psychotic Symptoms  Description: Signs and symptoms of listed problems will be absent or manageable.  Outcome: Progressing   Goal Outcome Evaluation:    Pt was admitted from [facility] ED - accompanied by EMS on stretcher.Pt is on a voluntary status. Pt has hx of Bipolar disorder, unspecified (H), acute psychosis.  According to ED noted pt endorerses auditory and visual hallucinations - rainbows and other pleasant things.       Upon arrival to the unit pt presents with a flat affect. Pt shared his mother (care take is deaf) she will communicates with pt thought text messages.    Pt endorsees - both visual and auditory hallucinations - voice in his tell him pleasant things and random words such as \"hemophilia and eckankar - a name of a Latter-day group\"    Pt was Pt denied pain, anxiety, depression, HI, SIB,  and contracted for safety. Pt was cooperative with the safety search and vitals which were completed by two male staff.     Pt signed all mental health consent forms and [facility].     Pt shared he is willing share his room with mild pt.   No behavioral or safety concerns noted.   "

## 2024-07-16 NOTE — PLAN OF CARE
" INITIAL PSYCHOSOCIAL ASSESSMENT AND NOTE    Information for assessment was obtained from:       [x]Patient     []Parent     []Community provider    [x]Hospital records   []Other     []Guardian       Presenting Problem:  Patient is a 24 year old male who uses he/him. Patient was admitted to [facility] on 7/9/2024 Station 6AE voluntarily.    Presenting issues and presentation for admit: Pt reports he was hearing voices in his head and maybe dealing with spiritual warfare. Pt reports he could tell his mother was in danger and wanted the room mate to leave because his mother was not safe. Pt reports he could feel his mother's body language. Pt reports his mother invited this random person to live with them because she did not have anywhere. Pt reports he had to come to the hospital because he had not been able to sleep and people around him was saying he was talking really fast.    Per Marion S Kpogba presents to the ED with family/friends (with mother). Patient is presenting to the ED for the following concerns: Paranoia, Other (see comment) (katja, psychosis).   Factors that make the mental health crisis life threatening or complex are:  Pt presents to the ED with his mother with concerns of katja and psychosis. Upon assessment, pt speaks barely above a whisper and requires several prompts in order for this writer to clearly understand him. He tells this writer that he has been extensively researching \"angelology\" and talks at length about various Sikh references, including seraphims and cherubims. He endorses auditory and visual hallucinations. When asked about their content, pt tells this writer that the voices are talking to him about the Bible \"all the time\" as well as about \"[place]\" and \"apprenticeship\". He reports hearing \"[place] languages\" and says that he has been speaking to a ghost. He states that the voices are also sometimes negative in nature, such " "as telling him that his mother is being harmed. Pt tells this writer that he has also been experiencing \"visions\" of snakes, animals, crystals, and eggs. He has seen himself be eaten by a shark as well as someone stab him. Pt lives with his mother and exhibits significant paranoia toward their roommate. He believes that their roommate has been using her \"eyes for witchcraft\" to terrify his mother. He also thinks that she has potentially been coughing to hypnotize his mother. Pt believes that their roommate is trying to trick him and has been looking at him \"like a robot\". Pt notes that he did recently threaten their roommate as he was concerned for his mother's safety. He reports having a pending terroristic threat charge related to this with court on 7/24. He denies any HI toward his roommate or others at present. When asked about SI, pt reports thinking about going to [facility] but denies a plan or intent for suicide. Pt tells this writer that he has been sleeping and eating very minimally. When asked about substance use, he reports recently drinking a Four Loco and using psilocybin mushrooms earlier this year. He states that he is not using psilocybin mushrooms at present but did \"take a lot\" earlier this year. He is worried that he may have caused harm to himself from his psilocybin use. Pt tells this writer that he has \"no clue what is going on\" at present but that he is \"sane\" and that his only goal is to protect his mother and go back to work to provide for her. He notes that his family has been having difficulty affording rent and food lately.     The following areas have been assessed:    History of Mental Health and Chemical Dependency:  Mental Health History:  Patient has a historical diagnosis of N/A.   The patient has a history of suicide attempts 1.   Patient  has a history of engaged in non-suicidal self-injury pt endorses a history of cutting on arms.     Previous psychiatric hospitalizations and " treatments (including outpatient, residential, and inpatient care:  [facility] from 05/16-05/17/24     Substance Use History  Alcohol, mushrooms      Patient's current relationship status is   single.   Patient reported having zero child(darcy).       Family Description (Constellation, significant information and events, Family Psychiatric History):   Paternal Grandmother-Bipolar Disorder    Significant Medical issues, Life events or Trauma history:   History of childhood trauma with many transitions. Patient from age [age]yo lived with aunt who had custody in [place].       Living Situation:  Patient's current living/housing situation is staying with family . They live with mother and mother's room mate and they report that housing is stable and they are able to return upon discharge.       Educational Background:    Patient's highest education level was some high school but no degree. Patient reports they are  able to understand written materials.     Occupational and Financial Status:     Patient is currently unemployed.  Patient reports  income is obtained through general assistance.  Patient does identify finances as a current stressor. They are insured under Self-pay. Restrictions (No/Yes): N/A    Occupational History:     Legal Concerns (current or past history):       Current Concerns: Domestic Violence or Terrorist threats    Past History: Unknown unable to assess      Legal Status:  Voluntary   County:   File Number:   Start and expiration date of commitment:     Commitment History:        Service History: PT denies    Ethnic/Cultural/Spiritual considerations:   The patient describes their cultural background as Black/, heterosexual, male.  Contextual influences on patient's health include severity of symptoms, lack of social support, level of functioning, and medication adherence concerns.   Patient identified their preferred language to be English, American Sign  Language. Patient reported they do not need the assistance of an .  Spiritual considerations include: Pt is an  male    Social Functioning (organizations, interests, support system):   In their free time, patient reports they like to Play video games.      Patient identified mother as part of their support system.  Patient identified the quality of these relationships as stable and meaningful.       Current Treatment Providers are: Pt has no providers.  Primary Care Provider:  Name/Clinic:    Number:     Medication Management/Psychiatry:  Name/Clinic:    Number:     Therapist:   Name/Clinic:   Number:     /ACT Team:  Name/Clinic:    Number:     Group Home:  Name/Clinic:    Number:     Other contact information (family, friends, SO) and KVNG status:       GOALS FOR HOSPITALIZATION:  What do patient want to accomplish during this hospitalization to make things better for the patient.?   Patient priorities:  The patient reported that what is most important to them is their mom and making sure she's okay. They identified stabilization as a goal of this hospitalization.    Social Service Assessment/Plan:  Patient view:   Patient reports it is important for the care team to know is they Plans on staying in  hospital to obtain treatment.  Upon discharge, they anticipate needing psychiatry and maybe therapy set up for them.      Strengths and Assets:  The patient uses these coping skills to help with stress and hard times: Listening to music and walking. Pt needs active insurance and would benefit from IRTS Placement. Pt appears to have limited insight into behaviors and presentation.        Patient will have psychiatric assessment and medication management by the psychiatrist. Medications will be reviewed and adjusted per DO/MD/APRN CNP as indicated. The treatment team will continue to assess and stabilize the patient's mental health symptoms with the use of medications and  therapeutic programming. Hospital staff will provide a safe environment and a therapeutic milieu. Staff will continue to assess patient as needed. Patient will participate in unit groups and activities. Patient will receive individual and group support on the unit.      CTC will do individual inpatient treatment planning and after care planning. CTC will discuss options for increasing community supports with the patient. CTC will coordinate with outpatient providers and will place referrals to ensure appropriate follow up care is in place.

## 2024-07-16 NOTE — PLAN OF CARE
Group Attendance:  attended full group    Time session began: 1315  Time session ended: 1415  Patient's total time in group: 60    Total # Attendees   7   Group Type psychotherapeutic and psychoeducational     Group Topic Covered healthy coping skills        Group Session Detail Patient attended a psycho-education group on the topic of boundaries. Patients learned the definition of a boundary, the areas of personal responsibility, how to identify healthy and unhealthy boundaries and how to change unhealthy boundaries into healthy boundaries. Patients worked together on how to set boundaries in various scenarios..      Patient's response to the group topic/interactions:  positive affect, cooperative with task, organized, socially appropriate, listened actively, and actively engaged     Patient Details: Patient was calm, cooperative and pleasant. He engaged appropriates and engaged in discussion without prompts.           [id number] - Group psychotherapy - 1 Session    Patient Active Problem List   Diagnosis    Bipolar disorder, unspecified (H)    Psychosis (H)    Substance abuse (H)    Acute psychosis (H)

## 2024-07-16 NOTE — PROGRESS NOTES
07/16/24 1057   Individualization/Patient Specific Goals   Patient Personal Strengths resilient   Patient Vulnerabilities adverse childhood experience(s);family/relationship conflict;occupational insecurity   Interprofessional Rounds   Summary New admit- pt admitted due to psychosis symptoms. Pt endoreses concerns that roommate in the home may harm pt's mother who also lives at home.   Participants advanced practice nurse;CTC;nursing;OT;psychotherapy   Behavioral Team Discussion   Participants Adolfo YIP, Nelly RN, Nasima OT, Woody CTC and Deya CTC therapist.   Progress NA new admit   Anticipated length of stay 5-7 days or until stabilization of symptoms.   Continued Stay Criteria/Rationale Stabilization of psychosis symptoms. Medication management.   Medical/Physical See H&P   Precautions See below   Plan Stabilize mental health symptoms and establish a safe discharge plan.   Safety Plan Safe secure milieu   Anticipated Discharge Disposition home with family     PRECAUTIONS AND SAFETY    Behavioral Orders   Procedures    Assault precautions    Code 1 - Restrict to Unit    Elopement precautions    [facility] Extended Care     Until discharge, Extended Care to offer psychotherapeutic services to mental health patients boarding for admission or stabilization. These services are to include but are not limited to: individual psychotherapy, diagnostic assessment, case management and care planning, safety planning, etc. This may include up to 1 visit per day. If patient is physically located at [facility] or [facility], group psychotherapy up to 2 time per day may be offered.     Routine Programming     As clinically indicated    Status 15     Every 15 minutes.

## 2024-07-16 NOTE — PLAN OF CARE
Team Note Due:  Tuesday    Assessment/Intervention/Current Symtoms and Care Coordination:  Chart review and met with team, discussed pt progress, symptomology, and response to treatment.  Discussed the discharge plan and any potential impediments to discharge.    [facility] sent message to  pool to determine if they can assist pt with insurance coverage as he is listed as self pay. CTC received p.c from Mary Jane with [employer] who reports she spoke with mom, pt has received support from mom and county worker named Marisol to complete a paper application that is going to be submitted.     CTC met with pt. CTC introduced self and discussed the insurance. Pt reports that he attempted to do the insurance application online, but it didn't work so they did the paper application. CTC discussed his criminal court hearing scheduled for next week, pt reports that he would like to talk about requesting it to be rescheduled if he is still here.     Discharge Plan or Goal:  Continue stabilization of mental health symptoms and establish a safe discharge plan.      Barriers to Discharge:  Patient requires further psychiatric stabilization due to current symptomology of psychosis as well as medication management.        Referral Status:  Pending stabilization and active insurance.      Legal Status:  Voluntary      Contacts:  [facility] - Mom- Ph [phone]- KVNG not signed- Pt's mom is also deaf and uses adaptive technology to communicate on the phone.       Upcoming Meetings and Dates/Important Information and next steps:  CTC to touch base with Financial Counseling to check on insurance status.     7/24/24- Pt has criminal court hearing- Time TBD- CTC to work with pt on requesting this be rescheduled or be completed virtually if pt is still at the hospital at this time.

## 2024-07-16 NOTE — PLAN OF CARE
Rehab Group    Start time: 1415  End time: 1500  Patient time total: 45 minutes    attended full group    #5 attended   Group Type: occupational therapy   Group Topic Covered: balanced lifestyle, cognitive activities, and healthy leisure time       Group Session Detail:  Cognitive Leisure Task     Patient Response/Contribution:  cooperative with task, organized, actively engaged, and engaged socially when prompted       Patient Detail:    Pt actively participated in a structured occupational therapy group with a focus on a visual-spatial leisure task. Pt was able to follow 2-step directions of the novel task, and demonstrated strategic planning and problem solving throughout the task. Pt remained focused and engaged for the full duration of group. Pt did not appear to be planning ahead for his turn but he did track the task consistently.           Train & Education Service Per Session 45 + Minutes () OT Group code    Patient Active Problem List   Diagnosis    Bipolar disorder, unspecified (H)    Psychosis (H)    Substance abuse (H)    Acute psychosis (H)

## 2024-07-16 NOTE — PROGRESS NOTES
"  Rehab Group    Start time: 1115  End time: 1210  Patient time total: 32 minutes    attended partial group    #5 attended   Group Type: dance movement   Group Topic Covered: emotional regulation and self-care     Group Session Detail:  Group focused on self-soothing using imagery like waves. This became other moving metaphors like flying or returning to a nest or making a \"nest of safety.\" An exploration of various qualities of movement (light, bounce, strong, etc.) supported an exploration of a range of mood.      Patient Response/Contribution:  listened actively, attentive, and actively engaged       Patient Detail:    Pt joined the session late but stood to express himself in subtle, but fluid movements.  He used creative self-expression as well as demonstrated the ability to join the movements of others.        Group Therapy (06894)    Patient Active Problem List   Diagnosis    Bipolar disorder, unspecified (H)    Psychosis (H)    Substance abuse (H)    Acute psychosis (H)      "

## 2024-07-16 NOTE — PLAN OF CARE
Problem: Sleep Disturbance  Goal: Adequate Sleep/Rest  Outcome: Progressing   Goal Outcome Evaluation:       Pt appeared to have slept for 6.75 hours.  Respirations are even and unlabored.  No medical/safety/behavioral concerns this shift.  No prn administered.  Pt is not on any precautions at this time.

## 2024-07-17 PROCEDURE — 250N000013 HC RX MED GY IP 250 OP 250 PS 637: Performed by: CLINICAL NURSE SPECIALIST

## 2024-07-17 PROCEDURE — 250N000013 HC RX MED GY IP 250 OP 250 PS 637: Performed by: REGISTERED NURSE

## 2024-07-17 PROCEDURE — 99232 SBSQ HOSP IP/OBS MODERATE 35: CPT | Performed by: REGISTERED NURSE

## 2024-07-17 PROCEDURE — 128N000002 HC R&B CD/MH ADOLESCENT

## 2024-07-17 PROCEDURE — G0177 OPPS/PHP; TRAIN & EDUC SERV: HCPCS

## 2024-07-17 RX ORDER — OLANZAPINE 10 MG/1
10 TABLET, ORALLY DISINTEGRATING ORAL 2 TIMES DAILY
Status: DISCONTINUED | OUTPATIENT
Start: 2024-07-17 | End: 2024-07-18

## 2024-07-17 RX ORDER — OLANZAPINE 10 MG/1
10 TABLET, ORALLY DISINTEGRATING ORAL DAILY
Status: DISCONTINUED | OUTPATIENT
Start: 2024-07-18 | End: 2024-07-17

## 2024-07-17 RX ORDER — OLANZAPINE 5 MG/1
5 TABLET, ORALLY DISINTEGRATING ORAL ONCE
Status: COMPLETED | OUTPATIENT
Start: 2024-07-17 | End: 2024-07-17

## 2024-07-17 RX ADMIN — OLANZAPINE 5 MG: 5 TABLET, ORALLY DISINTEGRATING ORAL at 08:07

## 2024-07-17 RX ADMIN — OLANZAPINE 10 MG: 10 TABLET, ORALLY DISINTEGRATING ORAL at 20:57

## 2024-07-17 RX ADMIN — OLANZAPINE 5 MG: 5 TABLET, ORALLY DISINTEGRATING ORAL at 09:48

## 2024-07-17 RX ADMIN — POLYETHYLENE GLYCOL 3350 17 G: 17 POWDER, FOR SOLUTION ORAL at 08:07

## 2024-07-17 ASSESSMENT — ACTIVITIES OF DAILY LIVING (ADL)
ADLS_ACUITY_SCORE: 29

## 2024-07-17 NOTE — PLAN OF CARE
Team Note Due:  Tuesday    Assessment/Intervention/Current Symtoms and Care Coordination:  Chart review and met with team, discussed pt progress, symptomology, and response to treatment.  Discussed the discharge plan and any potential impediments to discharge.      Discharge Plan or Goal:  Continue stabilization of mental health symptoms and establish a safe discharge plan.      Barriers to Discharge:  Patient requires further psychiatric stabilization due to current symptomology of psychosis as well as medication management.        Referral Status:  Pending stabilization and active insurance.      Legal Status:  Voluntary      Contacts:  [facility] - Mom- Ph [phone]- KVNG not signed- Pt's mom is also deaf and uses adaptive technology to communicate on the phone.       Upcoming Meetings and Dates/Important Information and next steps:  CTC to touch base with Financial Counseling to check on insurance status.     7/24/24- Pt has criminal court hearing- Time TBD- CTC to work with pt on requesting this be rescheduled or be completed virtually if pt is still at the hospital at this time.

## 2024-07-17 NOTE — PLAN OF CARE
Problem: Psychotic Symptoms  Goal: Psychotic Symptoms  Description: Signs and symptoms of listed problems will be absent or manageable.  Outcome: Progressing  Flowsheets (Taken 7/17/2024 1258)  Psychotic Symptoms Assessed:   suicidality   self injury   affect   mood   anxiety   orientation   speech   sleep   thought process   insight  Psychotic Symptoms Present:   thought process   insight   affect   mood   Goal Outcome Evaluation:  Pt continues to be calm and cooperative this shift, upon approach, pt presents with a flat,blunted, agreeable to check in.Pt endorses AH and VH, states that the voices are just making comments,nothing unsafe. Pt denies any SI/SIB.Reports good appetite and good sleep.Pt was compliant with scheduled meds,this morning dose Zyprexa increased from 5 mg to 10 mg.  Pt attended 2 groups,will continue to monitor.

## 2024-07-17 NOTE — PLAN OF CARE
Rehab Group    Start time: 1115  End time: 1205  Patient time total: 50 minutes    attended full group    #4 attended   Group Type: occupational therapy   Group Topic Covered: balanced lifestyle, cognitive activities, coping skills, healthy leisure time, problem solving, and social skills       Group Session Detail:  OT CLINIC     Patient Response/Contribution:  cooperative with task, organized, actively engaged, and Limited eye contact       Patient Detail:    Occupational therapy clinic to facilitate coping skill exploration, creative expression within personally meaningful activities, and clinical observation of social, cognitive, and kinesthetic performance skills. Pt response: Pt presented with blunted affect, limited eye contact. IND to initiate, gather materials, sequence, and adjust to workspace demands as needed for a familiar, simple creative expressive task. His focus toward task appeared to be good. Pt did not engage socially with peers/staff so difficult to assess organization in speech/thought process during this group.         Train & Education Service Per Session 45 + Minutes () OT Group code    Patient Active Problem List   Diagnosis    Bipolar disorder, unspecified (H)    Psychosis (H)    Substance abuse (H)    Acute psychosis (H)

## 2024-07-17 NOTE — PROGRESS NOTES
"[facility],  Psychiatric Progress Note        Interim History:   The patient's care was discussed with the treatment team and chart notes were reviewed.     Today during the interview with the treatment team staff report patient was attending groups and he slept 6.5 hours overnight. He has been calm and cooperative, medication complaint. No behavior issues.     Today patient was met in office. He was calm and cooperative. He reports he continues to have auditory and visual hallucinations. He states the voice and visions do not scare him or say mean things to him but are very distracting. He states the voices are \"asking me to ask you questions\". He reports his mood is elated and he feels \"high\". He an get distracted easily and states voices are \"similar to telepathy\". He reports sleep is good and he has been eating well at meals. When provider discussed call to patient's mother he was surprised that she did not want him to come back to live with her. He then states he understands why she would feel that way as he was text ing her a lot and asking her a lot of questions due to the voices. Provider discussed options for discharge and stated CTC and provider will continue to work with him as he progresses to discharge. Medications were discussed and he reports side effect of increased fatigued during daytime but he states it has helped with sleep.     The 10 point Review of Systems is negative other than noted in the HPI       DIagnoses:     Unspecified psychosis    Clinically Significant Risk Factors                                   # Financial/Environmental Concerns: unable to afford rent/mortgage;unemployed;unable to afford food (Pt reports that his family has been having difficulty paying rent and purchasing food. He would like to work but has been unable to at present due to his mental health concerns.)                Plan:   Patient is: voluntary     Routine lab " studies reviewed. Unremarkable. UTOX negative.      Monitor for target symptoms.      Provide a safe environment and therapeutic milieu.      Medications:  Zyprexa to 5 mg in am and 10 mg at bedtime.     Prns: Zyprexa for aggression, hydroxyzine 25-50 for anxiety, Seroquel for sleep     Encourage group programming as tolerated kayleigh amount of time: >35 minutes        Attestation:  Patient has been seen and evaluated by Adolfo yoder APRN CNP  The patient was counseled on  nature of illness and treatment plan/options  Care was coordinated with  unit RN    Disposition  Reason for ongoing hospitalization: patient is psychotic  Discharge place: IRTS as option  Discharge date: TBD         Medications:     Current Facility-Administered Medications   Medication Dose Route Frequency Provider Last Rate Last Admin    acetaminophen (TYLENOL) tablet 650 mg  650 mg Oral Q4H PRAdolfo Bliss APRN CNP        Or    acetaminophen (TYLENOL) Suppository 650 mg  650 mg Rectal Q4H PRN Adolfo Soler APRN CNP        OLANZapine (zyPREXA) injection 10 mg  10 mg Intramuscular BID PRN Viraj Quiroz MD        OLANZapine zydis (zyPREXA) ODT tab 10 mg  10 mg Oral BID Adolfo Soler APRN CNP        OLANZapine zydis (zyPREXA) ODT tab 10 mg  10 mg Oral BID PRN Viraj Quiroz MD        polyethylene glycol (MIRALAX) Packet 17 g  17 g Oral Daily Sarah Kim APRN CNP   17 g at 07/17/24 0807    QUEtiapine (SEROquel) tablet 50 mg  50 mg Oral At Bedtime PRN Adolfo Soler APRN CNP        sennosides (SENOKOT) tablet 8.6 mg  8.6 mg Oral BID PRN Adolfo Soler APRN CNP                 Allergies:   No Known Allergies         Psychiatric Examination:   /77   Pulse 77   Temp 98.2  F (36.8  C) (Temporal)   Resp 16   Wt 65 kg (143 lb 4.8 oz)   SpO2 99%   Weight is 143 lbs 4.8 oz  There is no height or weight on file to calculate BMI.    Appearance:  awake, alert, dressed in hospital scrubs, and slightly  unkempt  Attitude:  cooperative  Eye Contact:  good  Mood:  anxious  Affect:  appropriate and in normal range  Speech:  clear, coherent  Psychomotor Behavior:  no evidence of tardive dyskinesia, dystonia, or tics  Thought Process:  disorganized, illogical, and tangental  Associations:  loosening of associations present  Thought Content:  no evidence of suicidal ideation or homicidal ideation, auditory hallucinations present, and visual hallucinations present  Insight:  fair  Judgment:  fair  Oriented to:  time, person, and place  Attention Span and Concentration:  fair  Recent and Remote Memory:  fair           Labs:   No results found for this or any previous visit (from the past 24 hour(s)).

## 2024-07-17 NOTE — PLAN OF CARE
"  Rehab Group    Start time: 1015  End time: 1105  Patient time total: 45 minutes    attended full group     #3 attended   Group Type: occupational therapy   Group Topic Covered: balanced lifestyle, coping skills, mindfulness, and relaxation        Group Session Detail:  Stress Management & Mindfulness     Patient Response/Contribution:  cooperative with task, organized, socially appropriate, expressed understanding of topic, and actively engaged       Patient Detail:    General health and coping: where intervention focused on mindfulness education, practice of skills, and discussion of importance of regular daily practice for one's well-being and management of mental health symptoms. Pt Response: Pt was participative in the hands-on task of making a \"breathing beads\" mindfulness tool and exploration of exercises including 5-4-3-2-1, body scan, and mindful eating. Pt appeared engaged, actively listening to peers and contributed to the discussion that walking is a mindfulness skill they use for well-being or to help when in crisis to help regain self-control and calm down. Pt did appear distracted at times, but always was able to refocus and engage in the conversation independently.           Train & Education Service Per Session 45 + Minutes () OT Group code    Patient Active Problem List   Diagnosis    Bipolar disorder, unspecified (H)    Psychosis (H)    Substance abuse (H)    Acute psychosis (H)       "

## 2024-07-17 NOTE — PLAN OF CARE
Problem: Psychotic Symptoms  Goal: Social and Therapeutic (Psychotic Symptoms)  Description: Signs and symptoms of listed problems will be absent or manageable.  Outcome: Not Progressing  Flowsheets (Taken 7/16/2024 2132)  Psychotic Symptoms Assessed: all  Psychotic Symptoms Present:   insight   thought process   Goal Outcome Evaluation:  /77   Pulse 77   Temp 98.2  F (36.8  C) (Temporal)   Resp 16   Wt 65 kg (143 lb 4.8 oz)   SpO2 99%    Pt was out in the milieu isolated to himself around 1600. Good appetite and fluid intake and ate about 100% of his dinner. He was calm, cooperative and medication compliant. He denied having pain discomfort and medication side effects. After dinner pt went to bed until 2000 ate snacks and went back to bed. He denied having SI/SIB/AH HS and contracted for safety. He had flat blunted affect. He was unkempt.

## 2024-07-17 NOTE — PLAN OF CARE
Problem: Sleep Disturbance  Goal: Adequate Sleep/Rest  Outcome: Progressing   Goal Outcome Evaluation:  Focus: Shift summary    Data: Patient slept 6.5 hours last night. No interventions needed. Respirations even and unlabored on status 15 checks. Will continue to monitor and report to oncoming staff.    Response: Report sleep hours to day shift. Continue to monitor patient and provide therapeutic interventions as necessary.

## 2024-07-17 NOTE — PLAN OF CARE
INITIAL OT ASSESSMENT     07/16/24 1500   General Information   Date Initially Attended OT 07/16/24   Clinical Impression   Affect Fluctuates   Orientation Oriented to person, place and time   Appearance and ADLs General cleanliness observed in most areas   Attention to Internal Stimuli Appears distracted/preoccupied internally   Interaction Skills Interacts appropriately with staff;Interacts appropriately with peers   Ability to Communicate Needs Independent   Verbal Content Appropriate to topic  (fluctuates; some disorganization)   Ability to Maintain Boundaries Maintains appropriate physical boundaries;Maintains appropriate verbal boundaries   Participation Participates with minimal encouragement   Concentration Concentrates 20-30 minutes   Ability to Concentrate With structure   Follows and Comprehends Directions Needs direction simplified   Memory Needs further assessment   Organization Independently organizes simple tasks   Decision Making Independent   Planning and Problem Solving Occasionally needs assist/feedback   Ability to Apply and Learn Concepts Comprehends concepts, but needs assist to apply   Frustrations / Stress Tolerance Needs further assessment   Level of Insight Needs further assessment   Self Esteem Accepts positive feedback;Can identify positives   Social Supports Has knowledge of support systems   BEH OT Care Plan Goals   OT Care Plan relapse prevention

## 2024-07-17 NOTE — PLAN OF CARE
"  Rehab Group    Start time: 1315  End time: 1415  Patient time total: 50 minutes    attended full group    #4 attended   Group Type: occupational therapy   Group Topic Covered: balanced lifestyle, coping skills, mindfulness, and self-care       Group Session Detail:  Mental health management: Gratitude     Patient Response/Contribution:  cooperative with task, organized, socially appropriate, expressed understanding of topic, and actively engaged     Patient Detail:    Occupation-based group focused on gratitude towards support network. Pt Response: Pt engaged in a group discussion about the benefit of cultivating a \"gratitude habit\" and shared what they would like to incorporate into their daily routine \"developing a habit or patience\". Identified mom and his Orthodox as a significant support. Independent to participate in letter writing for support network. Of note, pt was observed laughing to himself in one instance during group. Difficult to assess if this was in response to internal stimuli.         Train & Education Service Per Session 45 + Minutes () OT Group code    Patient Active Problem List   Diagnosis    Bipolar disorder, unspecified (H)    Psychosis (H)    Substance abuse (H)    Acute psychosis (H)       "

## 2024-07-18 PROCEDURE — 128N000002 HC R&B CD/MH ADOLESCENT

## 2024-07-18 PROCEDURE — 250N000013 HC RX MED GY IP 250 OP 250 PS 637: Performed by: REGISTERED NURSE

## 2024-07-18 PROCEDURE — 90853 GROUP PSYCHOTHERAPY: CPT

## 2024-07-18 PROCEDURE — 90832 PSYTX W PT 30 MINUTES: CPT

## 2024-07-18 PROCEDURE — 250N000013 HC RX MED GY IP 250 OP 250 PS 637: Performed by: CLINICAL NURSE SPECIALIST

## 2024-07-18 PROCEDURE — 99232 SBSQ HOSP IP/OBS MODERATE 35: CPT | Performed by: REGISTERED NURSE

## 2024-07-18 PROCEDURE — G0177 OPPS/PHP; TRAIN & EDUC SERV: HCPCS

## 2024-07-18 RX ORDER — BENZTROPINE MESYLATE 0.5 MG/1
0.5 TABLET ORAL 2 TIMES DAILY PRN
Status: DISCONTINUED | OUTPATIENT
Start: 2024-07-18 | End: 2024-08-05 | Stop reason: HOSPADM

## 2024-07-18 RX ORDER — OLANZAPINE 15 MG/1
15 TABLET ORAL AT BEDTIME
Status: DISCONTINUED | OUTPATIENT
Start: 2024-07-18 | End: 2024-07-22

## 2024-07-18 RX ORDER — OLANZAPINE 10 MG/1
10 TABLET, ORALLY DISINTEGRATING ORAL DAILY
Status: DISCONTINUED | OUTPATIENT
Start: 2024-07-19 | End: 2024-07-22

## 2024-07-18 RX ADMIN — OLANZAPINE 15 MG: 15 TABLET, FILM COATED ORAL at 21:04

## 2024-07-18 RX ADMIN — OLANZAPINE 10 MG: 10 TABLET, ORALLY DISINTEGRATING ORAL at 08:27

## 2024-07-18 RX ADMIN — POLYETHYLENE GLYCOL 3350 17 G: 17 POWDER, FOR SOLUTION ORAL at 08:27

## 2024-07-18 ASSESSMENT — ACTIVITIES OF DAILY LIVING (ADL)
ADLS_ACUITY_SCORE: 29

## 2024-07-18 NOTE — PROGRESS NOTES
"[facility],  Psychiatric Progress Note        Interim History:   The patient's care was discussed with the treatment team and chart notes were reviewed.     Today during the interview with the treatment team staff report patient was attending groups and he slept 6.75 hours overnight. He has been calm and cooperative, medication complaint. No behavior issues.     Today patient has his notebook and is ready to have provider read it. Many pages were discussed and he has many random words on pages and drawing of visions he has had. He states he has been seeing rainbows. He is calm and pleasant and is very eager to talk about his writings and drawings. He then tells provider he wants to show his birthmarks on his hand and foot.  He is tangential and disorganized. He states the Zyprexa makes his a little tired during day and denies other side effects. He reports he is not depressed or anxious today. When talking he will say random things that are being said to say in his head. He is not afraid of the voices and they are not mean or telling him to harm himself or others. He is able to focus on conversation but he reports if he focuses on voices they will distract him and he cannot focus. He states since his friend dies in 2022 he has been lonely and does not trust many people or they take advantage of him. He reports sleep and appetite have been better since admission. He denies pain or medical concerns but state his \"hand hurt a little\". Provider states he has prn tylenol if he feels he need gerri relief. He denies current EPS symptoms and provider added prn cogentin.       The 10 point Review of Systems is negative other than noted in the HPI       DIagnoses:     Unspecified psychosis    Clinically Significant Risk Factors                                   # Financial/Environmental Concerns: unable to afford rent/mortgage;unemployed;unable to afford food (Pt reports that his " family has been having difficulty paying rent and purchasing food. He would like to work but has been unable to at present due to his mental health concerns.)              Plan:   Patient is: voluntary     Routine lab studies reviewed. Unremarkable. UTOX negative.      Monitor for target symptoms.      Provide a safe environment and therapeutic milieu.      Medications: Increase Zyprexa 10 mg every morning and 15 mg at bedtime.      Prns: Zyprexa for aggression, hydroxyzine 25-50 for anxiety, Seroquel for sleep. Cogentin 0.5 mg EPS     Encourage group programming as tolerated kayleigh amount of time: >35 minutes      Attestation:  Patient has been seen and evaluated by Adolfo yoder APRN CNP  The patient was counseled on  nature of illness and treatment plan/options  Care was coordinated with  unit RN    Disposition  Reason for ongoing hospitalization: patient is psychotic  Discharge place: [facility] as option  Discharge date: TBD         Medications:     Current Facility-Administered Medications   Medication Dose Route Frequency Provider Last Rate Last Admin    acetaminophen (TYLENOL) tablet 650 mg  650 mg Oral Q4H PRN Adolfo Soler APRN CNP        Or    acetaminophen (TYLENOL) Suppository 650 mg  650 mg Rectal Q4H PRN Adolfo Soler APRN CNP        benztropine (COGENTIN) tablet 0.5 mg  0.5 mg Oral BID PRN Adolfo Soler APRN CNP        OLANZapine (zyPREXA) injection 10 mg  10 mg Intramuscular BID PRN Viraj Quiroz MD        OLANZapine (zyPREXA) tablet 15 mg  15 mg Oral At Bedtime Adolfo Soler APRN CNP        [START ON 7/19/2024] OLANZapine zydis (zyPREXA) ODT tab 10 mg  10 mg Oral Daily Adolfo Soler APRN CNP        OLANZapine zydis (zyPREXA) ODT tab 10 mg  10 mg Oral BID PRN Viraj Quiroz MD        polyethylene glycol (MIRALAX) Packet 17 g  17 g Oral Daily Sarah Kim APRN CNP   17 g at 07/18/24 0827    QUEtiapine (SEROquel) tablet 50 mg  50 mg Oral At Bedtime PRN Adolfo Soler  APRN CNP        sennosides (SENOKOT) tablet 8.6 mg  8.6 mg Oral BID PRN Adolfo Soler, PHI SAMUEL                 Allergies:   No Known Allergies         Psychiatric Examination:   /82 (Patient Position: Sitting)   Pulse 76   Temp 98.2  F (36.8  C)   Resp 16   Wt 65 kg (143 lb 4.8 oz)   SpO2 100%   Weight is 143 lbs 4.8 oz  There is no height or weight on file to calculate BMI.    Appearance:  awake, alert, dressed in hospital scrubs, and slightly unkempt  Attitude:  cooperative  Eye Contact:  good  Mood:  good  Affect:  appropriate and in normal range  Speech:  clear, coherent  Psychomotor Behavior:  no evidence of tardive dyskinesia, dystonia, or tics  Thought Process:  disorganized, illogical, and tangental  Associations:  loosening of associations present  Thought Content:  no evidence of suicidal ideation or homicidal ideation, auditory hallucinations present, and visual hallucinations present  Insight:  fair  Judgment:  fair  Oriented to:  time, person, and place  Attention Span and Concentration:  fair  Recent and Remote Memory:  fair           Labs:   No results found for this or any previous visit (from the past 24 hour(s)).

## 2024-07-18 NOTE — PLAN OF CARE
"Individual Therapy Note      Date of Service: July 18, 2024    Patient: Pepe goes by \"Pepe,\" uses he/him pronouns    Individuals Present: Pepe Yamilet Mendez [facility]    Session start: 1330  Session end: 1400  Session duration in minutes: 30      Modality Used: Person Centered and Rapport Building    Goals: Improve focus and coping skills    Patient Description of current symptoms: Hard to stay focused     Mental Status Exam:   Attitude: cooperative  Eye Contact: good  Mood: good  Affect: appropriate and in normal range  Speech: clear, coherent  Psychomotor Behavior: no evidence of tardive dyskinesia, dystonia, or tics  Thought Process:  logical  Associations: no loose associations  Thought Content: no evidence of suicidal ideation or homicidal ideation and no evidence of psychotic thought  Insight: limited  Judgement: fair  Attention Span and Concentration: intact    Pt progress: Patient was calm, cooperative and polite. He has limited insight into is mental illness and what he needs going forward. He reported that he wants to be more successful and educated. He wants to be comfortable with where he's at and to have a family legacy. He reported that he has ADHD and has a hard time staying focused. He reports that mom brought him to the hospital because he was texting her a lot and saying crazy things. He wants a successful mindset but reports his thought patterns are getting in the way. He shared a lot about his upbringing. He and his mom moved a lot and were homeless and living with strangers. He was an only child raised by a single mom. He has only seen his father 3-4 x in his life. He has some connection with dad's side of the family. He reports that when he was 18 his mom signed over guardianship to his aunt in [place]. He reports he's never had an adult figure to talk to and no male role model. He feels he is always in survival mode and is never comfortable. He would like to build more confidence. 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 We discussed next steps to reaching his goals. Patient would benefit from outpatient therapy, case management and vocational rehab.                                                                                                                                                                                                                                            Treatment Objective(s) Addressed:   The focus of this session was on rapport building, orienting the patient to therapy, and building self-esteem     Progress Towards Goals and Assessment of Patient:   Patient is making progress towards treatment goals as evidenced by improved thinking and mood.       Therapeutic Intervention(s):   Provided active listening, unconditional positive regard, and validation.     Plan/next step: Meet as needed    09677 - Psychotherapy (with patient) - 30 (16-37*) min    Patient Active Problem List   Diagnosis    Bipolar disorder, unspecified (H)    Psychosis (H)    Substance abuse (H)    Acute psychosis (H)

## 2024-07-18 NOTE — PROGRESS NOTES
"  Rehab Group    Start time: 1015  End time: 1110  Patient time total: 55 minutes    attended full group     #3 attended   Group Type: dance movement   Group Topic Covered: balanced lifestyle, coping skills, healthy leisure time, and sensory intervention       Group Session Detail:  Group explored ranges of motion within the context of somatic memory of sports or dancing from when they were younger.  Memories supported mood integration related to grief and loss of those activities that are less available to them now.  Opening and closing supported by breath awareness allowed for titration of newly-felt emotions.       Patient Response/Contribution:  positive affect, cooperative with task, organized, supportive of peers, socially appropriate, listened actively, expressed understanding of topic, attentive, and actively engaged       Patient Detail:    Pt again demonstrated fluidity in his movements and shared more verbally than the previous session with this therapist. Pt used bridging motions and eye contact to demonstrated connections with others. He talked about the ephraim of \"go-go\" music in [place]. \[facility].\"" when he was growing up.  He smiled and shared this group was useful for him.        Group Therapy (17826)    Patient Active Problem List   Diagnosis    Bipolar disorder, unspecified (H)    Psychosis (H)    Substance abuse (H)    Acute psychosis (H)      "

## 2024-07-18 NOTE — PLAN OF CARE
Team Note Due:  Tuesday    Assessment/Intervention/Current Symtoms and Care Coordination:  Chart review and met with team, discussed pt progress, symptomology, and response to treatment.  Discussed the discharge plan and any potential impediments to discharge.    [facility] made p.c to Financial Counseling and spoke with Mary Jane. [facility] hasn't received any applications. There are complications due to pt's mom listing pt on a qualified health plan, but that it hasn't been approved yet. Mary Jane reports pt's mom will not be able to claim pt on taxes this next year and has informed pt of this to discuss with mom. Mary Jane indicates she will try with pt to apply tomorrow again as they cannot today since the [facility] closed pt out on the qualified health plan application today.     [facility] met with pt. Pt reports criminal court hearing on 7/24 with [facility]. Pt signed [employer]. CTC and pt called [facility] criminal courts. [facility] was informed pt should work with his public  Oanh Arnold to determine if pt can either reschedule court hearing on 7/24 or request it to be held virtually. CTC and pt left VM for Oanh to request this hearing either be held virtually or be rescheduled as pt will likely still be in the hospital.     Discharge Plan or Goal:  Continue stabilization of mental health symptoms and establish a safe discharge plan.      Barriers to Discharge:  Patient requires further psychiatric stabilization due to current symptomology of psychosis as well as medication management.        Referral Status:  Pending stabilization and active insurance. - Possible referrals to be followed up on: [facility]      Legal Status:  Voluntary      Contacts:  [facility] - Mom- Ph [phone]- [employer] not signed- Pt's mom is also deaf and uses adaptive technology to communicate on the phone.       Upcoming Meetings and Dates/Important Information and next steps:  CTC to touch base with Financial Counseling to check on insurance status.      7/24/24- Pt has criminal court hearing- Time TBD- CTC to work with pt on requesting this be rescheduled or be completed virtually if pt is still at the hospital at this time.

## 2024-07-18 NOTE — PLAN OF CARE
Rehab Group    Start time: 1415  End time: 1505  Patient time total: 50 minutes    attended full group    #5 attended   Group Type: occupational therapy   Group Topic Covered: balanced lifestyle, self-esteem, and social skills       Group Session Detail:  Mental health management: Life Management for recovery     Patient Response/Contribution:  cooperative with task, supportive of peers, socially appropriate, actively engaged, appeared confused , and verbalizations were off topic       Patient Detail:    Life management group for recovery. Areas addressed: Identification of personal strengths, feelings expression, and problem solving. Activity utilized discussion and leisure engagement to promote the ongoing development of life management skills and self-confidence. Pt Response: Pt maintained engagement for the full duration of group. Pt actively listened to peers and appeared comfortable sharing information r/t personal recovery. Pt presents with good self-esteem, evidenced by affirming comments toward himself. In addition however, pt did make occasional comments in discussion that appeared odd or unrelated to the discussion.          Train & Education Service Per Session 45 + Minutes () OT Group code    Patient Active Problem List   Diagnosis    Bipolar disorder, unspecified (H)    Psychosis (H)    Substance abuse (H)    Acute psychosis (H)

## 2024-07-18 NOTE — PLAN OF CARE
Problem: Depressive Symptoms  Goal: Depressive Symptoms  Description: Signs and symptoms of listed problems will be absent or manageable.  Outcome: Progressing     Problem: Anxiety Signs/Symptoms  Goal: Optimized Energy Level (Anxiety Signs/Symptoms)  Outcome: Progressing   Goal Outcome Evaluation:    Pt was isolative and withdrawn. Pt slept majority of shift and visible for pt needs such as food and shower.  Pt was alert and oriented x 4, pleasant and cooperative with staff.     VS stable.  Affect is blunted.  Pt showered this evening and changed to new scrubs.      Pt reports okay appetite, ate about 100%  of dinner and plus snacks.     Pt denied pain, anxiety, depression, HI, SIB, visual and auditory hallucinations, and contracted for safety.    NO PRN requested and received. Pt was medication compliant, denied medication side effects and medical concerns.

## 2024-07-18 NOTE — PLAN OF CARE
Rehab Group    Start time: 1115  End time: 1200  Patient time total: 45 minutes    attended full group    #4 attended   Group Type: occupational therapy   Group Topic Covered: balanced lifestyle, cognitive activities, coping skills, healthy leisure time, problem solving, and social skills       Group Session Detail:  OT CLINIC     Patient Response/Contribution:  cooperative with task, organized, actively engaged, and distracted        Patient Detail:    Occupational therapy clinic to facilitate coping skill exploration, creative expression within personally meaningful activities, and clinical observation of social, cognitive, and kinesthetic performance skills. Pt response: Pt presented with calm mood. Affect slightly brightening upon approach. 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 Pt remained IND With all performance skills. Demonstrated intermittent task focus, appeared distracted at times - particularly when a peer was reading the qaran out loud. Pt smiled to himself during this instance. Unknown if this response was due to discomfort or in response to internal stimuli.           Train & Education Service Per Session 45 + Minutes () OT Group code    Patient Active Problem List   Diagnosis    Bipolar disorder, unspecified (H)    Psychosis (H)    Substance abuse (H)    Acute psychosis (H)        English

## 2024-07-18 NOTE — PLAN OF CARE
Problem: Anxiety Signs/Symptoms  Goal: Optimized Energy Level (Anxiety Signs/Symptoms)  Outcome: Progressing     Problem: Psychotic Symptoms  Goal: Psychotic Symptoms  Description: Signs and symptoms of listed problems will be absent or manageable.  Outcome: Progressing  Flowsheets (Taken 7/18/2024 1215)  Psychotic Symptoms Assessed:   affect   mood   anxiety   orientation   speech   suicidality   self injury   insight   thought process  Psychotic Symptoms Present:   affect   mood   Goal Outcome Evaluation:  Pt presents with a flat,blunted affect but calm and cooperative upon approach.Pt denies any SI/SIB AH or VH this shift, pt reported less AH after the Zyprexa dose increase yesterday. Pt was compliant with scheduled meds, pt on miralax, reported last BM was today.  Pt reported good appetite and good sleep.  Attended groups.Will continue to monitor.

## 2024-07-18 NOTE — PLAN OF CARE
Problem: Anxiety Signs/Symptoms  Goal: Optimized Energy Level (Anxiety Signs/Symptoms)  Outcome: Progressing     Problem: Adult Behavioral Health Plan of Care  Goal: Plan of Care Review  Outcome: Progressing   Goal Outcome Evaluation:    Pt observed with limited socialization with peer and staff. Pt was alert and oriented x 4, pleasant and cooperative with staff. VS stable. Affect is flat.     Pt reports okay appetite, ate about 100% of dinner.     Pt denied pain, anxiety, depression, HI, SIB, visual and auditory hallucinations, and contracted for safety.    No PRN requested and received during this shift. Pt was medication compliant, denied pain, medication side effects and medical concerns.

## 2024-07-18 NOTE — PLAN OF CARE
BEH IP Unit Acuity Rating Score (UARS)  Patient is given one point for every criteria they meet.    CRITERIA SCORING   On a 72 hour hold, court hold, committed, stay of commitment, or revocation. 0    Patient LOS on BEH unit exceeds 20 days. 0  LOS: 2   Patient under guardianship, 55+, otherwise medically complex, or under age 11. 0   Suicide ideation without relief of precipitating factors. 0   Current plan for suicide. 0   Current plan for homicide. 0   Imminent risk or actual attempt to seriously harm another without relief of factors precipitating the attempt. 0   Severe dysfunction in daily living (ex: complete neglect for self care, extreme disruption in vegetative function, extreme deterioration in social interactions). 1   Recent (last 7 days) or current physical aggression in the ED or on unit. 0   Restraints or seclusion episode in past 72 hours. 0   Recent (last 7 days) or current verbal aggression, agitation, yelling, etc., while in the ED or unit. 0   Active psychosis. 1   Need for constant or near constant redirection (from leaving, from others, etc).  0   Intrusive or disruptive behaviors. 0   Patient requires 3 or more hours of individualized nursing care per 8-hour shift (i.e. for ADLs, meds, therapeutic interventions). 0   TOTAL 2

## 2024-07-18 NOTE — PLAN OF CARE
Team Note Due:  Tuesday    Assessment/Intervention/Current Symtoms and Care Coordination:  Chart review and met with team, discussed pt progress, symptomology, and response to treatment.  Discussed the discharge plan and any potential impediments to discharge.    [facility] made p.c to Financial Counseling and spoke with Mary Jane. [facility] hasn't received any applications. There are complications due to pt's mom listing pt on a qualified health plan, but that it hasn't been approved yet. Mary Jane reports pt's mom will not be able to claim pt on taxes this next year and has informed pt of this to discuss with mom. Mary Jane indicates she will try with pt to apply tomorrow again as they cannot today since the [facility] closed pt out on the qualified health plan application today.     [facility] met with pt. Pt reports criminal court hearing on 7/24 with [facility]. Pt signed [employer]. CTC and pt called [facility] criminal courts. [facility] was informed pt should work with his public  Oanh Barrett to determine if pt can either reschedule court hearing on 7/24 or request it to be held virtually. CTC and pt left  for Oanh to request this hearing either be held virtually or be rescheduled as pt will likely still be in the hospital.     Discharge Plan or Goal:  Continue stabilization of mental health symptoms and establish a safe discharge plan.      Barriers to Discharge:  Patient requires further psychiatric stabilization due to current symptomology of psychosis as well as medication management.        Referral Status:  Pending stabilization and active insurance. - Possible referrals to be followed up on: IRTS      Legal Status:  Voluntary      Contacts:  [facility] - Mom- Ph [phone]- KVNG not signed- Pt's mom is also deaf and uses adaptive technology to communicate on the phone.      Oanh Barrett -  - ph: [phone]       Upcoming Meetings and Dates/Important Information and next steps:  CTC to touch base  with Financial Counseling to check on insurance status.     7/24/24- Pt has criminal court hearing court case #[id number]-- Time 1:30pm- CTC to work with pt on requesting this be rescheduled or be completed virtually if pt is still at the hospital at this time.

## 2024-07-19 PROCEDURE — 128N000002 HC R&B CD/MH ADOLESCENT

## 2024-07-19 PROCEDURE — 250N000013 HC RX MED GY IP 250 OP 250 PS 637: Performed by: CLINICAL NURSE SPECIALIST

## 2024-07-19 PROCEDURE — 90853 GROUP PSYCHOTHERAPY: CPT

## 2024-07-19 PROCEDURE — 250N000013 HC RX MED GY IP 250 OP 250 PS 637: Performed by: REGISTERED NURSE

## 2024-07-19 PROCEDURE — G0177 OPPS/PHP; TRAIN & EDUC SERV: HCPCS

## 2024-07-19 PROCEDURE — 99231 SBSQ HOSP IP/OBS SF/LOW 25: CPT | Performed by: CLINICAL NURSE SPECIALIST

## 2024-07-19 RX ADMIN — POLYETHYLENE GLYCOL 3350 17 G: 17 POWDER, FOR SOLUTION ORAL at 08:08

## 2024-07-19 RX ADMIN — OLANZAPINE 10 MG: 10 TABLET, ORALLY DISINTEGRATING ORAL at 08:08

## 2024-07-19 RX ADMIN — OLANZAPINE 15 MG: 15 TABLET, FILM COATED ORAL at 19:45

## 2024-07-19 ASSESSMENT — ACTIVITIES OF DAILY LIVING (ADL)
ADLS_ACUITY_SCORE: 29

## 2024-07-19 NOTE — PLAN OF CARE
"  Problem: Anxiety Signs/Symptoms  Goal: Optimized Energy Level (Anxiety Signs/Symptoms)  Outcome: Progressing     Problem: Psychotic Symptoms  Goal: Psychotic Symptoms  Description: Signs and symptoms of listed problems will be absent or manageable.  Outcome: Progressing  Flowsheets (Taken 7/19/2024 1141)  Psychotic Symptoms Assessed:   affect   mood   thought process   insight   orientation   speech  Psychotic Symptoms Present: affect   Goal Outcome Evaluation:  Pt presents with a flat,blunted affect, isolated to self.Upon approach, pt is calm and cooperative with staff.Denies any SI/SIB AH or VH. Denies any anxiety or depression. Pt reported \"improvement in the voices\" since the medications were adjusted.   Pt out of room for meals, attended group.  Compliant with scheduled meds. No PRN's, will continue to monitor.  Addendum;  At around 1450; PA reported that while pt was in the shower, he got out naked and exposed himself to PA while he holding himself. Pt redirected. Pt reported the \"voices\" were telling him to do inappropriate things, pt verbalized understanding to come to staff when hearing voices or when he needs help.  Sexual precaution added.Approach pt in pairs.                        "

## 2024-07-19 NOTE — PLAN OF CARE
Team Note Due:  Tuesday    Assessment/Intervention/Current Symtoms and Care Coordination:  Chart review and met with team, discussed pt progress, symptomology, and response to treatment.  Discussed the discharge plan and any potential impediments to discharge.      Discharge Plan or Goal:  Continue stabilization of mental health symptoms and establish a safe discharge plan.      Barriers to Discharge:  Patient requires further psychiatric stabilization due to current symptomology of psychosis as well as medication management.        Referral Status:  Pending stabilization and active insurance. - Possible referrals to be followed up on: IRTS      Legal Status:  Voluntary      Contacts:  [facility] [phone]- KVNG not signed- Pt's mom is also deaf and uses adaptive technology to communicate on the phone.      Oanh Barrett -  - [phone] x [phone]      Upcoming Meetings and Dates/Important Information and next steps:  CTC to touch base with Financial Counseling to check on insurance status.     7/24/24- Pt has criminal court hearing in person- case number [id number]-- Time 1:30pm- CTC to work with pt on requesting this be rescheduled or be completed virtually if pt is still at the hospital at this time.

## 2024-07-19 NOTE — PLAN OF CARE
Rehab Group     Start time: 1000  End time: 1140  Patient time total: 100 minutes     attended full group     #4 attended   Group Type: OT Clinic   Group Topic Covered: cognitive activities, coping skills, healthy leisure time, and problem solving   Group Session Detail: OT: Education on healthy activity engagement and creative hands-on endeavor (OT clinic & [employer]) to increase concentration, focus, attention to task/detail, decision making, problem solving, frustration tolerance, task follow through, coping with stress, healthy leisure engagement, creative expression, and social engagement    Patient Response/Contribution:  cooperative with task, actively engaged, and Engaged on approach; Pleasant   Patient Detail: Second session of group. See previous note for details.      Train & Education Service Per Session 45 + Minutes () OT Group code         Patient Active Problem List   Diagnosis    Bipolar disorder, unspecified (H)    Psychosis (H)    Substance abuse (H)    Acute psychosis (H)

## 2024-07-19 NOTE — PLAN OF CARE
Group Attendance:  attended full group    Time session began: 1315  Time session ended: 1410  Patient's total time in group: 55    Total # Attendees   7-8   Group Type psychotherapeutic and psychoeducational     Group Topic Covered symptom management and healthy coping skills        Group Session Detail Patients discussed anxiety: definition, symptoms, anxiety cycle, and treatments for anxiety. Patient's discussed what causes their anxiety and practiced coping skills.      Patient's response to the group topic/interactions:  cooperative with task, organized, socially appropriate, attentive, and actively engaged     Patient Details: Patient was calm and cooperative. He was actively engaged in practicing coping skills and shared how giving a speech makes him anxious.           [id number] - Group psychotherapy - 1 Session    Patient Active Problem List   Diagnosis    Bipolar disorder, unspecified (H)    Psychosis (H)    Substance abuse (H)    Acute psychosis (H)

## 2024-07-19 NOTE — PLAN OF CARE
BEH IP Unit Acuity Rating Score (UARS)  Patient is given one point for every criteria they meet.    CRITERIA SCORING   On a 72 hour hold, court hold, committed, stay of commitment, or revocation. 0    Patient LOS on BEH unit exceeds 20 days. 0  LOS: 3   Patient under guardianship, 55+, otherwise medically complex, or under age 11. 0   Suicide ideation without relief of precipitating factors. 0   Current plan for suicide. 0   Current plan for homicide. 0   Imminent risk or actual attempt to seriously harm another without relief of factors precipitating the attempt. 0   Severe dysfunction in daily living (ex: complete neglect for self care, extreme disruption in vegetative function, extreme deterioration in social interactions). 1   Recent (last 7 days) or current physical aggression in the ED or on unit. 0   Restraints or seclusion episode in past 72 hours. 0   Recent (last 7 days) or current verbal aggression, agitation, yelling, etc., while in the ED or unit. 0   Active psychosis. 1   Need for constant or near constant redirection (from leaving, from others, etc).  0   Intrusive or disruptive behaviors. 0   Patient requires 3 or more hours of individualized nursing care per 8-hour shift (i.e. for ADLs, meds, therapeutic interventions). 0   TOTAL 2

## 2024-07-19 NOTE — PLAN OF CARE
Problem: Sleep Disturbance  Goal: Adequate Sleep/Rest  Outcome: Progressing   Goal Outcome Evaluation:  Focus: Shift summary    Data: Patient slept 6.75 hours last night. No interventions needed. Respirations even and unlabored on status 15 checks. Will continue to monitor and report to oncoming staff.    Response: Report sleep hours to day shift. Continue to monitor patient and provide therapeutic interventions as necessary.

## 2024-07-19 NOTE — SIGNIFICANT EVENT
At 1410h, Writer assisted Pt with getting shower supplies in the shower room. Pt grabbed everything and shut the shower stall door, affect and temperament normal (quiet and polite). As Writer was locking the shower room cabinet doors, Pt came out of the shower stall, not clothed and exposing his lower region and holding his groin area. Pt was smiling. Writer immediately turned away and told Pt that was not appropriate and to get back in the shower. Pt complied. Writer seemed to get the feeling that Pt exposed himself on purpose, as he did not say anything or ask for anything. Writer alerted Pt's nurse (Immaculate) and Charge (Griffin) about this event.

## 2024-07-19 NOTE — PLAN OF CARE
"  Rehab Group    Start time: 1000  End time: 1140  Patient time total: 100 minutes    attended full group    #4 attended   Group Type: OT Clinic   Group Topic Covered: cognitive activities, coping skills, healthy leisure time, and problem solving   Group Session Detail: OT: Education on healthy activity engagement and creative hands-on endeavor (OT clinic & [employer]) to increase concentration, focus, attention to task/detail, decision making, problem solving, frustration tolerance, task follow through, coping with stress, healthy leisure engagement, creative expression, and social engagement    Patient Response/Contribution:  cooperative with task, actively engaged, and Engaged on approach; Pleasant   Patient Detail: pt reported during check-in that \"sleeping better\" is something that is going well for him. Pt sat among peers for cognitive activity and engaged with peers and therapist on approach. Pt able to follow verbal directions and visual demonstration for novel cognitive task. Pt able to attend to task for duration of group and accepting of assistance from peer and therapist when needed. Pt verbalized understanding of importance of cognitive wellness in healthy lifestyle.      Train & Education Service Per Session 45 + Minutes () OT Group code    Patient Active Problem List   Diagnosis    Bipolar disorder, unspecified (H)    Psychosis (H)    Substance abuse (H)    Acute psychosis (H)         "

## 2024-07-19 NOTE — PROGRESS NOTES
"[facility], [place]   Psychiatric Progress Note        Interim History:   The patient's care was discussed with the treatment team during the daily team meeting and/or staff's chart notes were reviewed.  Staff report patient is visible in the milieu and attends groups.     Psychiatric symptoms and interventions:     Patient was cooperative with meeting with provider. Maryam reports, \"My voices are positive now\". \"They said I am special.\" Patient reports he can hear the voices arguing in his head.\" He is aware the \"voices are not real\". Patient said that he could see symbols while provider was talking. Patient reports he does not have command hallucinations. He does not have suicidal thinking.     Provider discussed his medications (Zyprexa) and reason for taking Zyprexa. Patient reports feeling tiered during the day but denies any other side effects.     Patient has adequate sleep and appetite.     No behavior concerns.          Medications:     Current Facility-Administered Medications   Medication Dose Route Frequency Provider Last Rate Last Admin    OLANZapine (zyPREXA) tablet 15 mg  15 mg Oral At Bedtime Adolfo Soler APRN CNP   15 mg at 07/18/24 2104    OLANZapine zydis (zyPREXA) ODT tab 10 mg  10 mg Oral Daily Adolfo Soler APRN CNP   10 mg at 07/19/24 0808    polyethylene glycol (MIRALAX) Packet 17 g  17 g Oral Daily Sarah Kim APRN CNP   17 g at 07/19/24 0808          Allergies:   No Known Allergies       Labs:   No results found for this or any previous visit (from the past 24 hour(s)).       Psychiatric Examination:     /78 (BP Location: Right arm, Patient Position: Sitting, Cuff Size: Adult Regular)   Pulse 96   Temp 98.4  F (36.9  C) (Temporal)   Resp 16   Wt 65 kg (143 lb 4.8 oz)   SpO2 95%   Weight is 143 lbs 4.8 oz  There is no height or weight on file to calculate BMI.  Orthostatic Vitals         Most Recent      Sitting Orthostatic /70 " 07/18 0851    Sitting Orthostatic Pulse (bpm) 97 07/18 0851    Standing Orthostatic /75 07/18 0851    Standing Orthostatic Pulse (bpm) 96 07/18 0851          Appearance:  awake, alert, dressed in hospital scrubs, and slightly unkempt  Attitude:  cooperative  Eye Contact:  good  Mood:  good  Affect:  appropriate and in normal range  Speech:  clear, coherent  Psychomotor Behavior:  no evidence of tardive dyskinesia, dystonia, or tics  Thought Process:  disorganized, illogical, and tangental  Associations:  loosening of associations present  Thought Content:  no evidence of suicidal ideation or homicidal ideation, auditory hallucinations present, and visual hallucinations present  Insight:  fair  Judgment:  fair  Oriented to:  time, person, and place  Attention Span and Concentration:  fair  Recent and Remote Memory:  fair           Clinical Global Impressions  First:     Most recent:            Precautions:     Behavioral Orders   Procedures    Assault precautions    Code 1 - Restrict to Unit    Elopement precautions    [facility] Extended [facility]     Until discharge, Extended Care to offer psychotherapeutic services to mental health patients boarding for admission or stabilization. These services are to include but are not limited to: individual psychotherapy, diagnostic assessment, case management and care planning, safety planning, etc. This may include up to 1 visit per day. If patient is physically located at [facility] or [facility], group psychotherapy up to 2 time per day may be offered.     Routine Programming     As clinically indicated    Status 15     Every 15 minutes.          DIagnoses:   Unspecified psychosis  Rule out Schizophrenia        Clinically Significant Risk Factors         Plan:   Legal status: Voluntary      Medication management:   Zyprexa ODT 10 mg daily, 15 mg HS to address auditory hallucinations.   Haldol, Benadryl, Ativan avaialable PRN for agitation/psychosis     Medical: Reviewed admission labs:  unremarkable UTOX negative     Behavioral/psychosis/social:   Encouraged patient to attend therapeutic hospital programming as tolerated.   Precautions: assault, elopement, no room mate     Disposition:   Reason for continued hospitalization: Patient is psychotic and not safe for discharge.   Stabilization with medication, provide structured and supportive environment   Provider consulted with CTC regarding discharge planning   Discharge Meds: not ordered   Discharge plan: TBD

## 2024-07-20 PROCEDURE — 250N000013 HC RX MED GY IP 250 OP 250 PS 637: Performed by: REGISTERED NURSE

## 2024-07-20 PROCEDURE — 128N000002 HC R&B CD/MH ADOLESCENT

## 2024-07-20 PROCEDURE — 250N000013 HC RX MED GY IP 250 OP 250 PS 637: Performed by: CLINICAL NURSE SPECIALIST

## 2024-07-20 RX ADMIN — ACETAMINOPHEN 650 MG: 325 TABLET, FILM COATED ORAL at 17:20

## 2024-07-20 RX ADMIN — OLANZAPINE 15 MG: 15 TABLET, FILM COATED ORAL at 20:06

## 2024-07-20 RX ADMIN — OLANZAPINE 10 MG: 10 TABLET, ORALLY DISINTEGRATING ORAL at 08:53

## 2024-07-20 RX ADMIN — POLYETHYLENE GLYCOL 3350 17 G: 17 POWDER, FOR SOLUTION ORAL at 08:54

## 2024-07-20 ASSESSMENT — ACTIVITIES OF DAILY LIVING (ADL)
ADLS_ACUITY_SCORE: 29
DRESS: SCRUBS (BEHAVIORAL HEALTH)
ADLS_ACUITY_SCORE: 29
LAUNDRY: UNABLE TO COMPLETE
ADLS_ACUITY_SCORE: 29
ORAL_HYGIENE: INDEPENDENT
ADLS_ACUITY_SCORE: 29
HYGIENE/GROOMING: HANDWASHING
ADLS_ACUITY_SCORE: 29

## 2024-07-20 NOTE — PLAN OF CARE
Problem: Adult Behavioral Health Plan of Care  Goal: Adheres to Safety Considerations for Self and Others  Outcome: Progressing     Problem: Sleep Disturbance  Goal: Adequate Sleep/Rest  Outcome: Progressing     Pt slept through the night without distress. Pt reported no pain or discomfort. No problems with behavior. No concerns reported by pt. Pt slept 7 hours.  Staff will continue to monitor.

## 2024-07-20 NOTE — PLAN OF CARE
Goal: Social and Therapeutic (Psychotic Symptoms)  Description: Signs and symptoms of listed problems will be absent or manageable.  Recent Flowsheet Documentation  Taken 7/19/2024 2058 by Mary Aparicio RN  Psychotic Symptoms Assessed: all  Psychotic Symptoms Present:   poor insight   poor thought process   Goal Outcome Evaluation:  /80 (BP Location: Right arm)   Pulse 102   Temp 98.3  F (36.8  C) (Oral)   Resp 16   Wt 65 kg (143 lb 4.8 oz)   SpO2 96%       Pt was asleep until dinner time ate dinner 100% and went back to bed. He was calm, cooperative and medication compliant. He was cleaned and well groomed. Pt was withdrawn with flat blunted affect. Pt denied having SI/SIB/AH/VH and anxiety. Voided good and last BM was today per pt. Denied having pain , discomfort and medication side effects. No in behavior noted during this shift. Pt was in his room isolated sleeping during this shift.

## 2024-07-20 NOTE — PLAN OF CARE
"  Problem: Psychotic Symptoms  Goal: Psychotic Symptoms  Description: Signs and symptoms of listed problems will be absent or manageable.  Outcome: Progressing  Flowsheets (Taken 7/20/2024 1307)  Psychotic Symptoms Assessed:   insight   suicidality   sleep   thought process   affect   speech   mood   orientation   anxiety  Psychotic Symptoms Present: affect   Goal Outcome Evaluation:    Plan of Care Reviewed With: patient    Pt presents with a flat,blunted affect, isolated to self.Upon approach, pt is calm and cooperative with staff.Denies any SI/SIB AH or VH. Denies any anxiety or depression. Pt reported \"improvement in the voices\"pt reported that voices are telling him good things about his family. No inappropriate reported behaviors this shift.  Pt out of room for meals, attended group.  Compliant with scheduled meds. No PRN's, will continue to monitor.           "

## 2024-07-20 NOTE — PLAN OF CARE
"  Problem: Psychotic Symptoms  Goal: Psychotic Symptoms  Description: Signs and symptoms of listed problems will be absent or manageable.  Outcome: Not Progressing  Flowsheets (Taken 7/20/2024 1745)  Psychotic Symptoms Assessed: all  Psychotic Symptoms Present:   poor thought process    poor insight   Goal Outcome Evaluation:  /74 (BP Location: Left arm, Patient Position: Sitting, Cuff Size: Adult Regular)   Pulse 90   Temp 97.6  F (36.4  C) (Oral)   Resp 16   Wt 65 kg (143 lb 4.8 oz)   SpO2 99%       Pt was out in the milieu social with other pts watching TV. Appetite was good ate 100% of his dinner. He was calm, cooperative and medication compliant. Pt was c/o headache around 1730 and gave him tylenol 650mg and it was effective but no medication side effect noted during this shift. Pt was cleaned and well groomed and took a shower. He had a flat blunted affect. Pt continued to endorse AH without a command \" the voice is only telling me good things sometimes bad but I am not going to act on it '' pt denied having SI/SIB/VH and anxiety.                    "

## 2024-07-21 PROCEDURE — 128N000002 HC R&B CD/MH ADOLESCENT

## 2024-07-21 PROCEDURE — 250N000013 HC RX MED GY IP 250 OP 250 PS 637: Performed by: REGISTERED NURSE

## 2024-07-21 PROCEDURE — 250N000013 HC RX MED GY IP 250 OP 250 PS 637: Performed by: CLINICAL NURSE SPECIALIST

## 2024-07-21 PROCEDURE — 250N000013 HC RX MED GY IP 250 OP 250 PS 637: Performed by: PSYCHIATRY & NEUROLOGY

## 2024-07-21 RX ADMIN — OLANZAPINE 15 MG: 15 TABLET, FILM COATED ORAL at 19:20

## 2024-07-21 RX ADMIN — ACETAMINOPHEN 650 MG: 325 TABLET, FILM COATED ORAL at 14:29

## 2024-07-21 RX ADMIN — POLYETHYLENE GLYCOL 3350 17 G: 17 POWDER, FOR SOLUTION ORAL at 08:42

## 2024-07-21 RX ADMIN — OLANZAPINE 10 MG: 10 TABLET, ORALLY DISINTEGRATING ORAL at 08:42

## 2024-07-21 RX ADMIN — NICOTINE POLACRILEX 4 MG: 2 GUM, CHEWING BUCCAL at 17:01

## 2024-07-21 ASSESSMENT — ACTIVITIES OF DAILY LIVING (ADL)
DRESS: SCRUBS (BEHAVIORAL HEALTH)
ADLS_ACUITY_SCORE: 29
LAUNDRY: UNABLE TO COMPLETE
ADLS_ACUITY_SCORE: 29
ORAL_HYGIENE: INDEPENDENT
ADLS_ACUITY_SCORE: 29
HYGIENE/GROOMING: HANDWASHING
ADLS_ACUITY_SCORE: 29
ADLS_ACUITY_SCORE: 29

## 2024-07-21 NOTE — PROGRESS NOTES
"CLINICAL NUTRITION SERVICES - BRIEF NOTE     Received RN consult - Pt requesting for double portions    Findings:  Pt assessed by RD 7/18. Pt was eating 100% of meals and snacks with good appetite and fluid intake since admission.     Labs and medications reviewed.     Wt Readings from Last 10 Encounters:   07/21/24 67.2 kg (148 lb 1.6 oz)   Care Everywhere:  5/16/24: 68 kg (150 lb)     Limited weight history available. Height per CE, 6'1\"      Interventions  Modify composition of meals/snacks - double portions of entree on meal trays    RD to sign off at this time, but will remain available by consult if new nutrition problem arises.      Mary Barajas RD, LD  Weekend/Holiday Vocera: Weekend Holiday Clinical Dietitian [Multi Site Groups]                   "

## 2024-07-21 NOTE — PLAN OF CARE
Problem: Psychotic Symptoms  Goal: Psychotic Symptoms  Description: Signs and symptoms of listed problems will be absent or manageable.  Outcome: Progressing  Flowsheets (Taken 7/21/2024 1130)  Psychotic Symptoms Assessed:   suicidality   self injury   affect   mood   anxiety   memory deficits   orientation   speech   sleep   thought process   insight  Psychotic Symptoms Present:   affect   mood  Goal: Social and Therapeutic (Psychotic Symptoms)  Description: Signs and symptoms of listed problems will be absent or manageable.  Recent Flowsheet Documentation  Taken 7/21/2024 1130 by Monica Vazquez RN  Psychotic Symptoms Assessed:   suicidality   self injury   affect   mood   anxiety   memory deficits   orientation   speech   sleep   thought process   insight  Psychotic Symptoms Present:   affect   mood   Goal Outcome Evaluation:    Plan of Care Reviewed With: patient    Pt was visible in the milieu.Compliant with scheduled meds, Denied any SI/SIB AH or VH, pt denied anxiety or depression. Pt denied having any VH/AH this shift. Pt attended music group and watched movies with peers.  Double portions with snacks in between meals ordered per pt's request.  Will continue to monitor.

## 2024-07-21 NOTE — PLAN OF CARE
Rehab Group    Start time: 1120  End time: 1200  Patient time total: 35 minutes    attended partial group     #3 attended   Group Type: occupational therapy   Group Topic Covered: cognitive activities, coping skills, healthy leisure time, and problem solving   Group Session Detail:Patient engaged in a leisure activity with a visuospatial and cognitive component in order to promote: problem solving skills, improve attention, emphasize new learning, exercise cognitive skills, and foster healthy leisure and symptom management.    Patient Response/Contribution:  cooperative with task, socially appropriate, and actively engaged   Patient Detail:pt shared he enjoys lying down and engaging in relaxation exercises for his cognitive wellness. Pt was polite and appropriate during interactions with staff and peers. Pt spoke in a softer voice at times. Pt was ind with cognitive task following initial instructions and demonstration. Pt responses were in context. Pt shared sustained focus is a challenge for him        No Charge    Patient Active Problem List   Diagnosis    Bipolar disorder, unspecified (H)    Psychosis (H)    Substance abuse (H)    Acute psychosis (H)

## 2024-07-21 NOTE — PLAN OF CARE
Problem: Sleep Disturbance  Goal: Adequate Sleep/Rest  Outcome: Not Progressing   Goal Outcome Evaluation:  Focus: Shift summary    Data: Patient slept 5.75 hours last night. No interventions needed. Respirations even and unlabored on status 15 checks. Will continue to monitor and report to oncoming staff.    Response: Report sleep hours to day shift. Continue to monitor patient and provide therapeutic interventions as necessary.

## 2024-07-22 PROCEDURE — 128N000002 HC R&B CD/MH ADOLESCENT

## 2024-07-22 PROCEDURE — G0177 OPPS/PHP; TRAIN & EDUC SERV: HCPCS

## 2024-07-22 PROCEDURE — 250N000013 HC RX MED GY IP 250 OP 250 PS 637: Performed by: PSYCHIATRY & NEUROLOGY

## 2024-07-22 PROCEDURE — 250N000013 HC RX MED GY IP 250 OP 250 PS 637: Performed by: CLINICAL NURSE SPECIALIST

## 2024-07-22 PROCEDURE — 250N000013 HC RX MED GY IP 250 OP 250 PS 637: Performed by: REGISTERED NURSE

## 2024-07-22 PROCEDURE — 90853 GROUP PSYCHOTHERAPY: CPT

## 2024-07-22 PROCEDURE — 99232 SBSQ HOSP IP/OBS MODERATE 35: CPT | Performed by: REGISTERED NURSE

## 2024-07-22 RX ORDER — OLANZAPINE 10 MG/1
20 TABLET ORAL AT BEDTIME
Status: DISCONTINUED | OUTPATIENT
Start: 2024-07-23 | End: 2024-08-05 | Stop reason: HOSPADM

## 2024-07-22 RX ORDER — OLANZAPINE 10 MG/1
10 TABLET ORAL AT BEDTIME
Status: COMPLETED | OUTPATIENT
Start: 2024-07-22 | End: 2024-07-22

## 2024-07-22 RX ORDER — PALIPERIDONE 3 MG/1
6 TABLET, EXTENDED RELEASE ORAL DAILY
Status: DISCONTINUED | OUTPATIENT
Start: 2024-07-22 | End: 2024-07-24

## 2024-07-22 RX ADMIN — PALIPERIDONE 6 MG: 3 TABLET, EXTENDED RELEASE ORAL at 14:50

## 2024-07-22 RX ADMIN — OLANZAPINE 10 MG: 10 TABLET, ORALLY DISINTEGRATING ORAL at 17:29

## 2024-07-22 RX ADMIN — NICOTINE POLACRILEX 2 MG: 2 GUM, CHEWING BUCCAL at 16:15

## 2024-07-22 RX ADMIN — OLANZAPINE 10 MG: 10 TABLET, ORALLY DISINTEGRATING ORAL at 08:15

## 2024-07-22 RX ADMIN — OLANZAPINE 10 MG: 10 TABLET, FILM COATED ORAL at 21:40

## 2024-07-22 RX ADMIN — POLYETHYLENE GLYCOL 3350 17 G: 17 POWDER, FOR SOLUTION ORAL at 08:15

## 2024-07-22 ASSESSMENT — ACTIVITIES OF DAILY LIVING (ADL)
ADLS_ACUITY_SCORE: 29

## 2024-07-22 NOTE — PLAN OF CARE
Problem: Psychotic Symptoms  Goal: Psychotic Symptoms  Description: Signs and symptoms of listed problems will be absent or manageable.  Outcome: Not Progressing  Flowsheets (Taken 7/21/2024 1944)  Psychotic Symptoms Assessed: all  Psychotic Symptoms Present:   his thought process is improving    insight    Goal Outcome Evaluation:  /67   Pulse 84   Temp 98.2  F (36.8  C) (Temporal)   Resp 16   Wt 67.2 kg (148 lb 1.6 oz)   SpO2 99%       Pt was out in the milieu social with other pt watching TV and playing games. He had good appetite and fluid intake and ate 100% of his dinner. Pt was calm, cooperative and medication compliant. Pt was cleaned and well groomed and took a shower. No outburst behavior noted during this shift. Pt continue to endorse AH without a command and denied having SI/SIB/VH and anxiety. Encouraged pt to socialized more with other pts than sleeping.                   no nausea/no numbness/no vomiting/no dizziness

## 2024-07-22 NOTE — PLAN OF CARE
Problem: Psychotic Symptoms  Goal: Psychotic Symptoms  Description: Signs and symptoms of listed problems will be absent or manageable.  Outcome: Progressing     Problem: Depressive Symptoms  Goal: Depressive Symptoms  Description: Signs and symptoms of listed problems will be absent or manageable.  7/22/2024 1609 by Parth Schroeder RN  Outcome: Progressing  7/22/2024 1052 by Parth Schroeder RN  Outcome: Progressing   Goal Outcome Evaluation:    Pt was in his room laying down majority of the shift. Pt was visible during meal, shower or to take meds or for snacks - return to room after.    Pt was alert and oriented x 4, pleasant and cooperative with staff. VS stable. Affect is blunted. Pt reports okay appetite, ate about 100 % of dinner.     Pt checked in as doing good. Pt denied pain, anxiety, depression, HI, SIB, visual and auditory hallucinations, and contracted for safety.    Pt was able to take a shower this evening. Pt was observed watching a movie with peers.    Pt requested and received PRN nicorette gum 2 mg for nicotine withdrawal symptoms and PRN zyPREXA at about 1729 for agitating and irritability. Spoken to Pharmacy to ensure pt was not overdosed, writer received ok before given scheduled night dose.  Pt was medication compliant, denied pain, medication side effects and medical concerns.

## 2024-07-22 NOTE — PLAN OF CARE
"  Rehab Group    Start time: 1020  End time: 1200  Patient time total: 80 minutes    attended full group    #6 attended   Group Type: occupational therapy   Group Topic Covered: balanced lifestyle, cognitive activities, coping skills, healthy leisure time, problem solving, and social skills       Group Session Detail:  OT CLINIC     Patient Response/Contribution:  cooperative with task, organized, socially appropriate, listened actively, and actively engaged       Patient Detail:    Occupational therapy clinic to facilitate coping skill exploration, creative expression within personally meaningful activities, and clinical observation of social, cognitive, and kinesthetic performance skills. Pt response: Pt answered check in question (what is one activity you like to do alone?) stating: \"staying active and busy, like working at [employer].\" Pt also shared desire to obtain a new job to work for a \"moving company\". Pt was IND to initiate, gather materials, sequence, and adjust to workspace demands as needed for a cognitive challenge task. Pt appeared comfortable asking for assistance when needed and was receptive assist provided. Good focus observed. Pt was interactive with both peers and staff.        Train & Education Service Per Session 45 + Minutes () OT Group code    Patient Active Problem List   Diagnosis    Bipolar disorder, unspecified (H)    Psychosis (H)    Substance abuse (H)    Acute psychosis (H)       "

## 2024-07-22 NOTE — PROGRESS NOTES
[facility],  Psychiatric Progress Note        Interim History:   The patient's care was discussed with the treatment team and chart notes were reviewed.     Today during the interview with the treatment team staff report patient was attending groups and he slept 7 hours overnight. He has been calm and cooperative, medication complaint.     Today patient was met in his room. He was cooperative. He acknowledges that coming out of shower naked was inappropriate. He states the voices told him to do it and he was not in the right mental place. He endorses voice are still present but slightly less and he is better able to concentrate. He denies depression and  his anxiety is more generalized. He thinks medications are helping some but is willing to start a new medication paliperidone. He reports he has been sleeping and eating adequately. He states he is still somewhat confused about haring the voices and he is still seeing flashes of visual hallucinations but less shapes. He denies thoughts to harm himself or others. He denies current side effects or EPS symptoms.       The 10 point Review of Systems is negative other than noted in the HPI       DIagnoses:     Unspecified psychosis    Clinically Significant Risk Factors                                   # Financial/Environmental Concerns: unable to afford rent/mortgage;unemployed;unable to afford food (Pt reports that his family has been having difficulty paying rent and purchasing food. He would like to work but has been unable to at present due to his mental health concerns.)              Plan:   Patient is: voluntary     Routine lab studies reviewed. Unremarkable. UTOX negative.      Monitor for target symptoms.      Provide a safe environment and therapeutic milieu.      Medications: Zyprexa 20 mg daily  Initiate paliperidone 6 mg daily.      Prns: Zyprexa for aggression, hydroxyzine 25-50 for anxiety, Seroquel for sleep.  Cogentin 0.5 mg EPS     Encourage group programming as tolerated kayleigh amount of time: >35 minutes      Attestation:  Patient has been seen and evaluated by Adolfo yoder APRN CNP  The patient was counseled on  nature of illness and treatment plan/options  Care was coordinated with  unit RN    Disposition  Reason for ongoing hospitalization: patient is psychotic  Discharge place: [facility] as option  Discharge date: TBD         Medications:     Current Facility-Administered Medications   Medication Dose Route Frequency Provider Last Rate Last Admin    acetaminophen (TYLENOL) tablet 650 mg  650 mg Oral Q4H PRN Adolfo Soler APRN CNP   650 mg at 07/21/24 1429    Or    acetaminophen (TYLENOL) Suppository 650 mg  650 mg Rectal Q4H PRN Adolfo Soler APRN CNP        benztropine (COGENTIN) tablet 0.5 mg  0.5 mg Oral BID PRN Adolfo Soler APRN CNP        nicotine (NICORETTE) gum 2-4 mg  2-4 mg Buccal Q1H PRN Jose Boucher MD   4 mg at 07/21/24 1701    OLANZapine (zyPREXA) injection 10 mg  10 mg Intramuscular BID PRN Viraj Quiroz MD        OLANZapine (zyPREXA) tablet 10 mg  10 mg Oral At Bedtime Adolfo Soler APRN CNP        [START ON 7/23/2024] OLANZapine (zyPREXA) tablet 20 mg  20 mg Oral At Bedtime Adolfo Soler APRN CNP        OLANZapine zydis (zyPREXA) ODT tab 10 mg  10 mg Oral BID PRN Viraj Quiroz MD        paliperidone ER (INVEGA) 24 hr tablet 6 mg  6 mg Oral Daily Adolfo Soler APRN CNP        polyethylene glycol (MIRALAX) Packet 17 g  17 g Oral Daily Sarah Kim APRN CNP   17 g at 07/22/24 0815    QUEtiapine (SEROquel) tablet 50 mg  50 mg Oral At Bedtime PRN Adolfo Soler APRN CNP        sennosides (SENOKOT) tablet 8.6 mg  8.6 mg Oral BID PRN Adolfo Soler APRN CNP                 Allergies:   No Known Allergies         Psychiatric Examination:   /67   Pulse 84   Temp 98.2  F (36.8  C) (Temporal)   Resp 16   Wt 67.2 kg (148 lb 1.6 oz)   SpO2 99%    Weight is 148 lbs 1.6 oz  There is no height or weight on file to calculate BMI.    Appearance:  awake, alert, dressed in hospital scrubs, and slightly unkempt  Attitude:  cooperative  Eye Contact:  good  Mood:  good  Affect:  appropriate and in normal range  Speech:  clear, coherent  Psychomotor Behavior:  no evidence of tardive dyskinesia, dystonia, or tics  Thought Process:  disorganized, illogical, and tangental  Associations:  loosening of associations present  Thought Content:  no evidence of suicidal ideation or homicidal ideation, auditory hallucinations present, and visual hallucinations present  Insight:  fair  Judgment:  fair  Oriented to:  time, person, and place  Attention Span and Concentration:  fair  Recent and Remote Memory:  fair           Labs:   No results found for this or any previous visit (from the past 24 hour(s)).

## 2024-07-22 NOTE — PLAN OF CARE
Team Note Due:  Tuesday    Assessment/Intervention/Current Symtoms and Care Coordination:  Chart review and met with team, discussed pt progress, symptomology, and response to treatment.  Discussed the discharge plan and any potential impediments to discharge.    [facility] received p.c from Mary Jane with Financial counseling stating that pt is still unable to apply for MA due to previous case still not being closed out. She reports she will continue to try on other days.     CTC met with pt. Pt signed KVNG for  for court case on 7/24 - [facility]. CTC and pt left VM for [facility] to request hearing to be held remotely or be rescheduled. CTC updated pt on insurance status and applying.     CTC left another voicemail for [place] -  to attempt to reach her to discuss pt getting hearing on 7/24 rescheduled or to change to virtual hearing.     [facility] made p.c to [facility] court. [facility] was informed that [facility] can reach out to the court scheduling team to discuss requesting this to be held remotely. Ph: [phone]. [facility] received p.c and spoke with staff, [facility] was informed there is a form that can be completed to request a remote hearing if the  is not responding. He reports that there cannot be a request for a continuance without the  but this request for remote hearing can be submitted without his . He sent this form to [facility] via email. [facility] assisted pt with completing this form and [facility] sent this back to [facility] via email.       Discharge Plan or Goal:  Continue stabilization of mental health symptoms and establish a safe discharge plan.      Barriers to Discharge:  Patient requires further psychiatric stabilization due to current symptomology of psychosis as well as medication management.        Referral Status:  Pending stabilization and active insurance. - Possible referrals to be followed up on: [facility]      Legal Status:  Voluntary      Contacts:  GRIS Bangura-   [phone]- KVNG not signed- Pt's mom is also deaf and uses adaptive technology to communicate on the phone.      Oan[facility] Vuley -  - [phone]      Upcoming Meetings and Dates/Important Information and next steps:  CTC to touch base with Financial Counseling to check on insurance status.     7/24/24- Pt has criminal court hearing in person- case number [id number]-- Time 1:30pm- CTC to work with pt on requesting this be rescheduled or be completed virtually if pt is still at the hospital at this time.

## 2024-07-22 NOTE — PLAN OF CARE
"  Rehab Group    Start time: 1315  End time: 1410  Patient time total: 55 minutes    attended full group    #6 attended   Group Type: occupational therapy   Group Topic Covered: balanced lifestyle, coping skills, self-care, and social skills       Group Session Detail:  Values exploration     Patient Response/Contribution:  cooperative with task, organized, supportive of peers, socially appropriate, listened actively, expressed understanding of topic, and actively engaged       Patient Detail:    General health and coping group with a focus on identifying and prioritizing personal values. Pt Response: Pt contributed to a group discussion that facilitated self-reflection and application of personal values. Using a list of values, pt identified \"honesty, self--expression, and fitness\" as their most important values. When prompted to share one takeaway from the group/one value they hope to focus on in the future, pt shared his desire to work with deaf individuals since he has experience with it from his mother and believes he could make an impact in this way.           Train & Education Service Per Session 45 + Minutes () OT Group code    Patient Active Problem List   Diagnosis    Bipolar disorder, unspecified (H)    Psychosis (H)    Substance abuse (H)    Acute psychosis (H)       "

## 2024-07-22 NOTE — PLAN OF CARE
BEH IP Unit Acuity Rating Score (UARS)  Patient is given one point for every criteria they meet.    CRITERIA SCORING   On a 72 hour hold, court hold, committed, stay of commitment, or revocation. 0    Patient LOS on BEH unit exceeds 20 days. 0  LOS: 6   Patient under guardianship, 55+, otherwise medically complex, or under age 11. 0   Suicide ideation without relief of precipitating factors. 0   Current plan for suicide. 0   Current plan for homicide. 0   Imminent risk or actual attempt to seriously harm another without relief of factors precipitating the attempt. 0   Severe dysfunction in daily living (ex: complete neglect for self care, extreme disruption in vegetative function, extreme deterioration in social interactions). 1   Recent (last 7 days) or current physical aggression in the ED or on unit. 0   Restraints or seclusion episode in past 72 hours. 0   Recent (last 7 days) or current verbal aggression, agitation, yelling, etc., while in the ED or unit. 0   Active psychosis. 1   Need for constant or near constant redirection (from leaving, from others, etc).  0   Intrusive or disruptive behaviors. 0   Patient requires 3 or more hours of individualized nursing care per 8-hour shift (i.e. for ADLs, meds, therapeutic interventions). 0   TOTAL 2

## 2024-07-22 NOTE — PLAN OF CARE
Problem: Depressive Symptoms  Goal: Depressive Symptoms  Description: Signs and symptoms of listed problems will be absent or manageable.  Outcome: Progressing     Problem: Anxiety Signs/Symptoms  Goal: Optimized Energy Level (Anxiety Signs/Symptoms)  Outcome: Progressing  Intervention: Optimize Energy Level  Recent Flowsheet Documentation  Taken 7/22/2024 1000 by Parth Schroeder RN  Diversional Activity: music   Goal Outcome Evaluation:    Pt was up early, self isolative and withdrawn to room.  Pt was alert and oriented x 4, pleasant and cooperative with staff. VS stable. Affect is blunted. Pt reports okay appetite, ate about 100% of breakfast and 100% of lunch.     Pt checked in as doing okay, rated anxiety at 3/10 and depression at 4/10 with 10 being worst. Pt rated overall mood at anxious.  Pt denies SI/SIB/HI. Denies visual and auditory hallucinations.     Pt's mother visited today - to provide additional court documentation.     No PRN requested and received PRN. Pt was medication compliant, denied pain, medication side effects and medical concerns.

## 2024-07-23 PROCEDURE — 90853 GROUP PSYCHOTHERAPY: CPT

## 2024-07-23 PROCEDURE — H2032 ACTIVITY THERAPY, PER 15 MIN: HCPCS

## 2024-07-23 PROCEDURE — 128N000002 HC R&B CD/MH ADOLESCENT

## 2024-07-23 PROCEDURE — G0177 OPPS/PHP; TRAIN & EDUC SERV: HCPCS

## 2024-07-23 PROCEDURE — 99232 SBSQ HOSP IP/OBS MODERATE 35: CPT | Performed by: REGISTERED NURSE

## 2024-07-23 PROCEDURE — 250N000013 HC RX MED GY IP 250 OP 250 PS 637: Performed by: REGISTERED NURSE

## 2024-07-23 RX ADMIN — PALIPERIDONE 6 MG: 3 TABLET, EXTENDED RELEASE ORAL at 08:20

## 2024-07-23 RX ADMIN — ACETAMINOPHEN 650 MG: 325 TABLET, FILM COATED ORAL at 18:00

## 2024-07-23 RX ADMIN — OLANZAPINE 20 MG: 10 TABLET, FILM COATED ORAL at 20:25

## 2024-07-23 ASSESSMENT — ACTIVITIES OF DAILY LIVING (ADL)
ADLS_ACUITY_SCORE: 29

## 2024-07-23 NOTE — PROGRESS NOTES
07/23/24 1411   Individualization/Patient Specific Goals   Patient Personal Strengths resilient   Patient Vulnerabilities adverse childhood experience(s);family/relationship conflict;occupational insecurity   Interprofessional Rounds   Summary Pt continues to endorse hallucinations. Discharge plan and barriers discussed.   Participants advanced practice nurse;CTC;nursing;OT;psychotherapy   Behavioral Team Discussion   Participants Jame Denney RN, Nasima OT, Woody CTC and Deya CTC therapist.   Progress minimal progress   Anticipated length of stay 5-7 days or until stabilization of symptoms.   Continued Stay Criteria/Rationale Stabilization of psychosis symptoms. Medication management.   Medical/Physical See H&P   Precautions See below   Plan Stabilize mental health symptoms and establish a safe discharge plan.   Safety Plan Safe secure milieu   Anticipated Discharge Disposition [facility]     PRECAUTIONS AND SAFETY    Behavioral Orders   Procedures    Assault precautions    Code 1 - Restrict to Unit    Elopement precautions    [facility] Extended Care     Until discharge, Extended Care to offer psychotherapeutic services to mental health patients boarding for admission or stabilization. These services are to include but are not limited to: individual psychotherapy, diagnostic assessment, case management and care planning, safety planning, etc. This may include up to 1 visit per day. If patient is physically located at [facility] or [facility], group psychotherapy up to 2 time per day may be offered.     Routine Programming     As clinically indicated    Sexual precautions     Pt came out of shower and exposed himself to staff.Approach in pairs    Status 15     Every 15 minutes.       Safety  Safety WDL: WDL  Patient Location: patient room, own  Observed Behavior: calm  Observed Behavior (Comment): cooperative  Safety Measures: safety rounds completed  Diversional Activity: music  Assault: status 15, private room  Elopement  Assessment:  (No signs of elopement)

## 2024-07-23 NOTE — PLAN OF CARE
BEH IP Unit Acuity Rating Score (UARS)  Patient is given one point for every criteria they meet.    CRITERIA SCORING   On a 72 hour hold, court hold, committed, stay of commitment, or revocation. 0    Patient LOS on BEH unit exceeds 20 days. 0  LOS: 7   Patient under guardianship, 55+, otherwise medically complex, or under age 11. 0   Suicide ideation without relief of precipitating factors. 0   Current plan for suicide. 0   Current plan for homicide. 0   Imminent risk or actual attempt to seriously harm another without relief of factors precipitating the attempt. 0   Severe dysfunction in daily living (ex: complete neglect for self care, extreme disruption in vegetative function, extreme deterioration in social interactions). 1   Recent (last 7 days) or current physical aggression in the ED or on unit. 0   Restraints or seclusion episode in past 72 hours. 0   Recent (last 7 days) or current verbal aggression, agitation, yelling, etc., while in the ED or unit. 0   Active psychosis. 1   Need for constant or near constant redirection (from leaving, from others, etc).  0   Intrusive or disruptive behaviors. 0   Patient requires 3 or more hours of individualized nursing care per 8-hour shift (i.e. for ADLs, meds, therapeutic interventions). 0   TOTAL 2

## 2024-07-23 NOTE — PLAN OF CARE
Rehab Group    Start time: 1015  End time: 1105  Patient time total: 50 minutes    attended full group    #6 attended   Group Type: occupational therapy   Group Topic Covered: balanced lifestyle, cognitive activities, coping skills, healthy leisure time, problem solving, and social skills       Group Session Detail:  OT CLINIC     Patient Response/Contribution:  cooperative with task, organized, actively engaged, and did not share thoughts verbally       Patient Detail:    Occupational therapy clinic to facilitate coping skill exploration, creative expression within personally meaningful activities, and clinical observation of social, cognitive, and kinesthetic performance skills. Pt response: Pt was more withdrawn today, less social with peers/staff. Pt IND to initiate, gather materials, sequence, and adjust to workspace demands as needed for his familiar, detail-oriented task. Pt demonstrated excellent task focus, directing his attention toward his task and did not engage socially with peers but appeared comfortable asking for assistance when needed from writer.          Train & Education Service Per Session 45 + Minutes () OT Group code    Patient Active Problem List   Diagnosis    Bipolar disorder, unspecified (H)    Psychosis (H)    Substance abuse (H)    Acute psychosis (H)

## 2024-07-23 NOTE — PROGRESS NOTES
Rehab Group    Start time: 1115  End time: 1210  Patient time total: 55 minutes    attended full group     #3 attended   Group Type: dance movement   Group Topic Covered: balanced lifestyle       Group Session Detail:  Pts explored psychotherapeutic goals of cohesion and synchronicity for a sense of belonging and sharing ephraim.  Mood regulation and cognitive organization were also found in shared movement metaphors initiated by patient experience.  Pt-centered dance/movement therapy interventions.     Patient Response/Contribution:  positive affect, cooperative with task, and organized       Patient Detail:    Pt was quiet but open.  He was fluid and organized in his movement and expressed a range of spatial motion.          Group Therapy ([id number])    Patient Active Problem List   Diagnosis    Bipolar disorder, unspecified (H)    Psychosis (H)    Substance abuse (H)    Acute psychosis (H)

## 2024-07-23 NOTE — PROGRESS NOTES
[facility],  Psychiatric Progress Note        Interim History:   The patient's care was discussed with the treatment team and chart notes were reviewed.     Today during the interview with the treatment team staff report patient was attending groups and he slept 7 hours overnight. He has been calm and cooperative, medication complaint.     Today patient was met in room he was calm and cooperative. He reports he still has auditory hallucinations. He is able to concentrate on conversation more. He reports he has less visual hallucinations and has to close his eyes to see shapes now. He reports mild decrease in symptoms generally. He states he will not act on voices again and has had no behaviors since shower event. He denies depression and he has range of affect and smiles occasionally. He is willing to go to IRTS facility after discharge and continue taking medications. He reports he feels more grounded taking medications. He states sleep has been better and he has less nightmares now. He denies thoughts to harm himself or others.  His appetite is improved since admission. He has not needed prn medications and has been medication complaint. He denies pain or other medical concerns. He denies EPS symptoms.     The 10 point Review of Systems is negative other than noted in the HPI       DIagnoses:     Unspecified psychosis    Clinically Significant Risk Factors                                   # Financial/Environmental Concerns: unable to afford rent/mortgage;unemployed;unable to afford food (Pt reports that his family has been having difficulty paying rent and purchasing food. He would like to work but has been unable to at present due to his mental health concerns.)              Plan:   Patient is: voluntary     Routine lab studies reviewed. Unremarkable. UTOX negative.      Monitor for target symptoms.      Provide a safe environment and therapeutic milieu.       Medications: Zyprexa 20 mg daily  Paliperidone 6 mg daily.      Prns: Zyprexa for aggression, hydroxyzine 25-50 for anxiety, Seroquel for sleep. Cogentin 0.5 mg EPS     Encourage group programming as tolerated kayleigh amount of time: >35 minutes      Attestation:  Patient has been seen and evaluated by Adolfo yoder APRN CNP  The patient was counseled on  nature of illness and treatment plan/options  Care was coordinated with  unit RN    Disposition  Reason for ongoing hospitalization: patient is psychotic  Discharge place: IRTS as option  Discharge date: TBD         Medications:     Current Facility-Administered Medications   Medication Dose Route Frequency Provider Last Rate Last Admin    acetaminophen (TYLENOL) tablet 650 mg  650 mg Oral Q4H PRN Adolfo Soler APRN CNP   650 mg at 07/21/24 1429    Or    acetaminophen (TYLENOL) Suppository 650 mg  650 mg Rectal Q4H PRN Adolfo Soler APRN CNP        benztropine (COGENTIN) tablet 0.5 mg  0.5 mg Oral BID PRN Adolfo Soler APRN CNP        nicotine (NICORETTE) gum 2-4 mg  2-4 mg Buccal Q1H PRN Jose Boucher MD   2 mg at 07/22/24 1615    OLANZapine (zyPREXA) injection 10 mg  10 mg Intramuscular BID PRN Viraj Quiroz MD        OLANZapine (zyPREXA) tablet 20 mg  20 mg Oral At Bedtime Adolfo Soler APRN CNP        OLANZapine zydis (zyPREXA) ODT tab 10 mg  10 mg Oral BID PRN Viraj Quiroz MD   10 mg at 07/22/24 1729    paliperidone ER (INVEGA) 24 hr tablet 6 mg  6 mg Oral Daily Adolfo Soler APRN CNP   6 mg at 07/23/24 0820    polyethylene glycol (MIRALAX) Packet 17 g  17 g Oral Daily Sarah Kim APRN CNP   17 g at 07/22/24 0815    QUEtiapine (SEROquel) tablet 50 mg  50 mg Oral At Bedtime PRN Adolfo Soler APRN CNP        sennosides (SENOKOT) tablet 8.6 mg  8.6 mg Oral BID PRN Adolfo Soler APRN CNP                 Allergies:   No Known Allergies         Psychiatric Examination:   /67   Pulse 84   Temp 98.2  F  (36.8  C) (Temporal)   Resp 16   Wt 67.2 kg (148 lb 1.6 oz)   SpO2 99%   Weight is 148 lbs 1.6 oz  There is no height or weight on file to calculate BMI.    Appearance:  awake, alert, dressed in hospital scrubs, and slightly unkempt  Attitude:  cooperative  Eye Contact:  good  Mood:  good  Affect:  appropriate and in normal range  Speech:  clear, coherent  Psychomotor Behavior:  no evidence of tardive dyskinesia, dystonia, or tics  Thought Process:  disorganized and illogical  Associations:  no loose associations  Thought Content:  no evidence of suicidal ideation or homicidal ideation, auditory hallucinations present, and visual hallucinations present  Insight:  fair  Judgment:  fair  Oriented to:  time, person, and place  Attention Span and Concentration:  fair  Recent and Remote Memory:  fair           Labs:   No results found for this or any previous visit (from the past 24 hour(s)).

## 2024-07-23 NOTE — PLAN OF CARE
Group Attendance:  attended full group    Time session began: 1400   Time session ended: 1445  Patient's total time in group: 45    Total # Attendees   7   Group Type psychotherapeutic     Group Topic Covered emotional regulation, insight improvement, relationships, and incorporating skill sets        Group Session Detail Group members checked in with how they are feeling and each spent time checking in with something to process in group. Writer offered support, psychoeducation, and suggestions as needed Pt's offered support and feedback to each other.      Patient's response to the group topic/interactions:  cooperative with task, socially appropriate, listened actively, expressed understanding of topic, and attentive     Patient Details: Pt listened actively and initially didn't have a lot to check in about, but after a few minutes shared that he found a lot of the discussion helpful and shared that he related to a lot of what others were going through.            [id number] - Group psychotherapy - 1 Session    Patient Active Problem List   Diagnosis    Bipolar disorder, unspecified (H)    Psychosis (H)    Substance abuse (H)    Acute psychosis (H)

## 2024-07-23 NOTE — PLAN OF CARE
Rehab Group    Start time: 1415  End time: 1515  Patient time total: 60 minutes    attended full group    #4 attended   Group Type: occupational therapy   Group Topic Covered: balanced lifestyle       Group Session Detail:  Life Skills Discussion     Patient Response/Contribution:  cooperative with task, organized, socially appropriate, listened actively, expressed understanding of topic, attentive, and actively engaged       Patient Detail:    General health and coping group with a discussion focused on various mental health topics, including mental health management, social situations, healthy support systems, healthy mind/body, and activities of daily living. Pt Response: Pt responded to prompts thoughtfully and insightfully.        Train & Education Service Per Session 45 + Minutes () OT Group code    Patient Active Problem List   Diagnosis    Bipolar disorder, unspecified (H)    Psychosis (H)    Substance abuse (H)    Acute psychosis (H)

## 2024-07-23 NOTE — PLAN OF CARE
Problem: Depressive Symptoms  Goal: Depressive Symptoms  Description: Signs and symptoms of listed problems will be absent or manageable.  Outcome: Progressing   Goal Outcome Evaluation:       This RN approached pt and he was excepting of his meds. He was pacing in his room.  He was appropriate with behaviors as of this moment.  He was not forthcoming regarding feelings. We will continue to assess

## 2024-07-23 NOTE — PLAN OF CARE
Team Note Due:  Tuesday    Assessment/Intervention/Current Symtoms and Care Coordination:  Chart review and met with team, discussed pt progress, symptomology, and response to treatment.  Discussed the discharge plan and any potential impediments to discharge.    CTC reviewed M[employer] for the court hearing tomorrow, pt has been approved for a remote hearing for the case.     CTC received p.c from Mary Jane with Financial counseling reporting the MA has been approved and they are just waiting on a PMI for pt.     CTC met with pt and informed of insurance status and hearing being held remotely tomorrow.     Discharge Plan or Goal:  Continue stabilization of mental health symptoms and establish a safe discharge plan.      Barriers to Discharge:  Patient requires further psychiatric stabilization due to current symptomology of psychosis as well as medication management.        Referral Status:  Pending stabilization and active insurance. - Possible referrals to be followed up on: IRTS      Legal Status:  Voluntary      Contacts:  GRIS FRIAS - Mom- Ph [phone]- KVNG not signed- Pt's mom is also deaf and uses adaptive technology to communicate on the phone.      Oanh Barrett -  - [phone] x [phone]      Upcoming Meetings and Dates/Important Information and next steps:  [facility] to discuss IRTS referrals with pt.    7/24/24- Pt has criminal court hearing via zoom- case number [id number]-- Time 1:30pm-

## 2024-07-23 NOTE — PROGRESS NOTES
Rehab Group     Start time: 1600  End time: 1645  Patient time total: 40 minutes     attended partial group     #5 attended   Group Type: music   Group Topic Covered: balanced lifestyle, mindfulness, and relaxation       Group Session Detail: Cooperatively engaged in Music Relaxation group to decrease anxiety and offer emotional containment.        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  Calm affect, appropriately engaged in session, responding well to the music.  Smiled and laughed at times with the music.       Activity Therapy Per 15 minutes () Other Rehab therapies    Patient Active Problem List   Diagnosis    Bipolar disorder, unspecified (H)    Psychosis (H)    Substance abuse (H)    Acute psychosis (H)

## 2024-07-23 NOTE — PLAN OF CARE
Group Attendance:  attended full group    Time session began: 1315  Time session ended: 1400  Patient's total time in group: 45    Total # Attendees   5   Group Type psychotherapeutic, psychoeducational, and CBT     Group Topic Covered insight improvement, symptom management, and healthy coping skills     Group Session Detail Group topic: the Cognitive Model. The relationship between thoughts, emotions and behaviors was discussed. Participants discussed several scenarios and ways to change irrational thoughts into positive and rational thoughts. They also discussed the new emotion and behavior that came from positive, rational thoughts. Cognitive distortions were discussed and patients identified those they struggle with and how to overcome them.      Patient's response to the group topic/interactions:  cooperative with task, organized, expressed readiness to alter behaviors, socially appropriate, and actively engaged     Patient Details: Patient was calm, cooperative and engaged. He gave insightful responses to the scenarios and was able to identify his cognitive distortions.           85005 - Group psychotherapy - 1 Session    Patient Active Problem List   Diagnosis    Bipolar disorder, unspecified (H)    Psychosis (H)    Substance abuse (H)    Acute psychosis (H)

## 2024-07-24 PROCEDURE — 250N000013 HC RX MED GY IP 250 OP 250 PS 637: Performed by: CLINICAL NURSE SPECIALIST

## 2024-07-24 PROCEDURE — 250N000013 HC RX MED GY IP 250 OP 250 PS 637: Performed by: PSYCHIATRY & NEUROLOGY

## 2024-07-24 PROCEDURE — G0177 OPPS/PHP; TRAIN & EDUC SERV: HCPCS

## 2024-07-24 PROCEDURE — 99232 SBSQ HOSP IP/OBS MODERATE 35: CPT | Performed by: REGISTERED NURSE

## 2024-07-24 PROCEDURE — 128N000002 HC R&B CD/MH ADOLESCENT

## 2024-07-24 PROCEDURE — 250N000013 HC RX MED GY IP 250 OP 250 PS 637: Performed by: REGISTERED NURSE

## 2024-07-24 RX ORDER — PALIPERIDONE 3 MG/1
6 TABLET, EXTENDED RELEASE ORAL ONCE
Status: DISCONTINUED | OUTPATIENT
Start: 2024-07-24 | End: 2024-07-24

## 2024-07-24 RX ORDER — PALIPERIDONE 3 MG/1
6 TABLET, EXTENDED RELEASE ORAL ONCE
Status: COMPLETED | OUTPATIENT
Start: 2024-07-24 | End: 2024-07-24

## 2024-07-24 RX ORDER — PALIPERIDONE 3 MG/1
9 TABLET, EXTENDED RELEASE ORAL DAILY
Status: DISCONTINUED | OUTPATIENT
Start: 2024-07-25 | End: 2024-08-05 | Stop reason: HOSPADM

## 2024-07-24 RX ADMIN — PALIPERIDONE 6 MG: 3 TABLET, EXTENDED RELEASE ORAL at 08:32

## 2024-07-24 RX ADMIN — OLANZAPINE 20 MG: 10 TABLET, FILM COATED ORAL at 19:07

## 2024-07-24 RX ADMIN — QUETIAPINE FUMARATE 50 MG: 50 TABLET ORAL at 19:07

## 2024-07-24 RX ADMIN — POLYETHYLENE GLYCOL 3350 17 G: 17 POWDER, FOR SOLUTION ORAL at 09:09

## 2024-07-24 ASSESSMENT — ACTIVITIES OF DAILY LIVING (ADL)
ADLS_ACUITY_SCORE: 29

## 2024-07-24 NOTE — PLAN OF CARE
BEH IP Unit Acuity Rating Score (UARS)  Patient is given one point for every criteria they meet.    CRITERIA SCORING   On a 72 hour hold, court hold, committed, stay of commitment, or revocation. 0    Patient LOS on BEH unit exceeds 20 days. 0  LOS: 8   Patient under guardianship, 55+, otherwise medically complex, or under age 11. 0   Suicide ideation without relief of precipitating factors. 0   Current plan for suicide. 0   Current plan for homicide. 0   Imminent risk or actual attempt to seriously harm another without relief of factors precipitating the attempt. 0   Severe dysfunction in daily living (ex: complete neglect for self care, extreme disruption in vegetative function, extreme deterioration in social interactions). 1   Recent (last 7 days) or current physical aggression in the ED or on unit. 0   Restraints or seclusion episode in past 72 hours. 0   Recent (last 7 days) or current verbal aggression, agitation, yelling, etc., while in the ED or unit. 0   Active psychosis. 1   Need for constant or near constant redirection (from leaving, from others, etc).  0   Intrusive or disruptive behaviors. 0   Patient requires 3 or more hours of individualized nursing care per 8-hour shift (i.e. for ADLs, meds, therapeutic interventions). 0   TOTAL 2

## 2024-07-24 NOTE — PLAN OF CARE
Problem: Adult Behavioral Health Plan of Care  Goal: Plan of Care Review  Outcome: Progressing   Goal Outcome Evaluation:       Pt has been pleasant thru the morning. He took his meds and ate breakfast.  We discussed constipation and he was given his miralax.  He admits to hearing voices but denies any SI or SIB.  He has been easily redirectable.  ADL's OK.  Vitals WDL  Will continue to assess.

## 2024-07-24 NOTE — PLAN OF CARE
Rehab Group    Start time: 1115  End time: 1200  Patient time total: 45 minutes    attended full group    #4 attended   Group Type: occupational therapy   Group Topic Covered: balanced lifestyle, cognitive activities, coping skills, healthy leisure time, problem solving, and social skills       Group Session Detail:  OT CLINIC     Patient Response/Contribution:  cooperative with task, organized, safe use of materials/supplies, and actively engaged       Patient Detail:    Occupational therapy clinic to facilitate coping skill exploration, creative expression within personally meaningful activities, and clinical observation of social, cognitive, and kinesthetic performance skills. Pt response: Pt presented with neutral affect, calm mood. IND to initiate, gather materials, sequence, and adjust to workspace demands as needed. Appeared comfortable asking for supplies as needed and returning them upon completion of group. Excellent task focus observed for his detailed sketching task. Overall, pleasant participant.          Train & Education Service Per Session 45 + Minutes () OT Group code    Patient Active Problem List   Diagnosis    Bipolar disorder, unspecified (H)    Psychosis (H)    Substance abuse (H)    Acute psychosis (H)

## 2024-07-24 NOTE — PLAN OF CARE
"Problem: Depressive Symptoms  Goal: Depressive Symptoms  Description: Signs and symptoms of listed problems will be absent or manageable.  Outcome: Progressing     Problem: Adult Inpatient Plan of Care  Goal: Absence of Hospital-Acquired Illness or Injury  Intervention: Prevent Infection  Recent Flowsheet Documentation  Taken 7/23/2024 1900 by Parth Schroeder RN  Infection Prevention: single patient room provided     Goal Outcome Evaluation:    Pt was visible in the pura, - observed attending group, watching TV with peers and limited socialization with peers.      Pt was alert and oriented x 4, pleasant and cooperative with staff. VS stable.     Affect is blunted.     Pt reports okay appetite, ate about 100 % of dinner.     Pt checked in as doing okay.    Pt rated overall mood at \"good\".  Pt endorses both visual and auditory hallucinations - saw flashes and heard echo voices of his family member.       Pt denies anxiety, depression, SI/SIB/HI. Denied pain, medication side effects and medical concerns    No PRN was requested and received during this shift.     Pt was medication compliant, and no goals were set for pt today.  "

## 2024-07-24 NOTE — PROGRESS NOTES
"[facility],  Psychiatric Progress Note        Interim History:   The patient's care was discussed with the treatment team and chart notes were reviewed.     Today during the interview with the treatment team staff report patient was attending groups and he slept 6.5 hours overnight. He has been calm and cooperative, medication complaint.     Today patient was met in room he was calm and cooperative. He reports auditory hallucinations are softer but remain present. He is able to concentrate on conversation and is less distracted by voices.  At times he hears intrusive thoughts that say things like \"kick them\" but otherwise are not negative or telling him to harm others. He denies suicidal ideations or homicidal ideations. His mood has been better since starting paliperidone he denies depression or anxiety. He states sleep was not the best last night. He denies thoughts to harm himself or others.  His appetite is adequate. He has not needed prn medications and has been medication complaint. He denies pain or other medical concerns. He denies EPS symptoms.     The 10 point Review of Systems is negative other than noted in the HPI       DIagnoses:     Unspecified psychosis    Clinically Significant Risk Factors                                   # Financial/Environmental Concerns: unable to afford rent/mortgage;unemployed;unable to afford food (Pt reports that his family has been having difficulty paying rent and purchasing food. He would like to work but has been unable to at present due to his mental health concerns.)              Plan:   Patient is: voluntary     Routine lab studies reviewed. Unremarkable. UTOX negative.      Monitor for target symptoms.      Provide a safe environment and therapeutic milieu.      Medications: Zyprexa 20 mg daily  Increase Paliperidone 9 mg daily.      Prns: Zyprexa for aggression, hydroxyzine 25-50 for anxiety, Seroquel for sleep. " Cogentin 0.5 mg EPS     Encourage group programming as tolerated kayleigh amount of time: >35 minutes      Attestation:  Patient has been seen and evaluated by Adolfo yoder APRN CNP  The patient was counseled on  nature of illness and treatment plan/options  Care was coordinated with  unit RN    Disposition  Reason for ongoing hospitalization: patient is psychotic  Discharge place: IRTS as option  Discharge date: TBD         Medications:     Current Facility-Administered Medications   Medication Dose Route Frequency Provider Last Rate Last Admin    acetaminophen (TYLENOL) tablet 650 mg  650 mg Oral Q4H PRN Adolfo Soler APRN CNP   650 mg at 07/23/24 1800    Or    acetaminophen (TYLENOL) Suppository 650 mg  650 mg Rectal Q4H PRN Adolfo Soler APRN CNP        benztropine (COGENTIN) tablet 0.5 mg  0.5 mg Oral BID PRN Adolfo Soler APRN CNP        nicotine (NICORETTE) gum 2-4 mg  2-4 mg Buccal Q1H PRN Jose Boucher MD   2 mg at 07/22/24 1615    OLANZapine (zyPREXA) injection 10 mg  10 mg Intramuscular BID PRN Viraj Quiroz MD        OLANZapine (zyPREXA) tablet 20 mg  20 mg Oral At Bedtime Adolfo Soler APRN CNP   20 mg at 07/23/24 2025    OLANZapine zydis (zyPREXA) ODT tab 10 mg  10 mg Oral BID PRN Viraj Quiroz MD   10 mg at 07/22/24 1729    [START ON 7/25/2024] paliperidone ER (INVEGA) 24 hr tablet 9 mg  9 mg Oral Daily Adolfo Soler APRN CNP        polyethylene glycol (MIRALAX) Packet 17 g  17 g Oral Daily Sarah Kim APRN CNP   17 g at 07/22/24 0815    QUEtiapine (SEROquel) tablet 50 mg  50 mg Oral At Bedtime PRN Adolfo Soler APRN CNP        sennosides (SENOKOT) tablet 8.6 mg  8.6 mg Oral BID PRN Adolfo Soler APRN CNP                 Allergies:   No Known Allergies         Psychiatric Examination:   /68 (BP Location: Left arm)   Pulse 95   Temp 98  F (36.7  C) (Temporal)   Resp 16   Wt 68.9 kg (152 lb)   SpO2 99%   Weight is 152 lbs 0 oz  There is no  height or weight on file to calculate BMI.    Appearance:  awake, alert, dressed in hospital scrubs, and slightly unkempt  Attitude:  cooperative  Eye Contact:  good  Mood:  good  Affect:  appropriate and in normal range  Speech:  clear, coherent  Psychomotor Behavior:  no evidence of tardive dyskinesia, dystonia, or tics  Thought Process:  illogical at times, more organized  Associations:  no loose associations  Thought Content:  no evidence of suicidal ideation or homicidal ideation, auditory hallucinations present, and visual hallucinations present  Insight:  fair  Judgment:  fair  Oriented to:  time, person, and place  Attention Span and Concentration:  fair  Recent and Remote Memory:  fair           Labs:   No results found for this or any previous visit (from the past 24 hour(s)).

## 2024-07-24 NOTE — PLAN OF CARE
Rehab Group    Start time: 1015  End time: 1105  Patient time total: 50 minutes    attended full group    #4 attended   Group Type: occupational therapy   Group Topic Covered: coping skills, mindfulness, relaxation , and self-care       Group Session Detail:  Coping Skills Exploration: Calming Collage     Patient Response/Contribution:  cooperative with task, organized, socially appropriate, listened actively, expressed understanding of topic, and actively engaged       Patient Detail:    Topic group for general health and coping focused on creative expression of coping skills. Pt Response: Pt selected a positive theme for their coping skills collage. Pt verbalized/demonstrated understanding of coping skills, and identified various skills while making their collage. Pt was organized in their task approach, and demonstrated good planning, focus, and attention to detail.           Train & Education Service Per Session 45 + Minutes () OT Group code    Patient Active Problem List   Diagnosis    Bipolar disorder, unspecified (H)    Psychosis (H)    Substance abuse (H)    Acute psychosis (H)

## 2024-07-24 NOTE — PLAN OF CARE
Problem: Adult Behavioral Health Plan of Care  Goal: Plan of Care Review  Outcome: Progressing     Problem: Depressive Symptoms  Goal: Depressive Symptoms  Description: Signs and symptoms of listed problems will be absent or manageable.  Outcome: Progressing   Goal Outcome Evaluation:  Pt was visible in the milieu - pt was observed watching a movie with peers.  Pt was alert and oriented x 4, pleasant and cooperative with staff.     VS stable.   Affect is flat.     Pt reports okay appetite, ate about 100% of dinner.     Pt checked in as doing good, Pt denied pain, anxiety, depression, HI, SIB, visual and auditory hallucinations, and contracted for safety.      No PRN was requested and received during this shift. Pt was medication compliant, denied  medication side effects and medical concerns.

## 2024-07-24 NOTE — PLAN OF CARE
Problem: Sleep Disturbance  Goal: Adequate Sleep/Rest  Outcome: Progressing   Goal Outcome Evaluation:  Pt was observed sleeping for 6.50 hrs uninterrupted. Respirations were normal, soft, unlabored and regular. No prns  were administered. 15 minutes safety rounds were completed.

## 2024-07-24 NOTE — PLAN OF CARE
Team Note Due:  Tuesday    Assessment/Intervention/Current Symtoms and Care Coordination:  Chart review and met with team, discussed pt progress, symptomology, and response to treatment.  Discussed the discharge plan and any potential impediments to discharge.    CTC received VM from Mary Jane with Financial counseling reporting pt's insurance is active and he has a PMI.     CTC assisted pt with getting set up with criminal court hearing.     CTC met with pt and discussed IRTS placements. Pt was agreeable to referrals being sent and signed general KVNG.     CTC faxed IRTS referrals.     Discharge Plan or Goal:  Continue stabilization of mental health symptoms and establish a safe discharge plan.      Barriers to Discharge:  Patient requires further psychiatric stabilization due to current symptomology of psychosis as well as medication management.        Referral Status:  CTC sent referrals to IRTS placements on 7/24:    [facility]  [facility]     Legal Status:  Voluntary      Contacts:  GRIS KPOGBA - Mom- Ph [phone]- KVNG not signed- Pt's mom is also deaf and uses adaptive technology to communicate on the phone.      Oanh Barrett -  - [phone] x [phone]      Upcoming Meetings and Dates/Important Information and next steps:  CTC to follow up on IRTS referrals.

## 2024-07-25 PROCEDURE — G0177 OPPS/PHP; TRAIN & EDUC SERV: HCPCS

## 2024-07-25 PROCEDURE — 99232 SBSQ HOSP IP/OBS MODERATE 35: CPT | Performed by: REGISTERED NURSE

## 2024-07-25 PROCEDURE — 250N000013 HC RX MED GY IP 250 OP 250 PS 637: Performed by: REGISTERED NURSE

## 2024-07-25 PROCEDURE — 250N000013 HC RX MED GY IP 250 OP 250 PS 637: Performed by: CLINICAL NURSE SPECIALIST

## 2024-07-25 PROCEDURE — 128N000002 HC R&B CD/MH ADOLESCENT

## 2024-07-25 RX ADMIN — POLYETHYLENE GLYCOL 3350 17 G: 17 POWDER, FOR SOLUTION ORAL at 08:19

## 2024-07-25 RX ADMIN — OLANZAPINE 20 MG: 10 TABLET, FILM COATED ORAL at 19:01

## 2024-07-25 RX ADMIN — QUETIAPINE FUMARATE 50 MG: 50 TABLET ORAL at 19:00

## 2024-07-25 RX ADMIN — PALIPERIDONE 9 MG: 3 TABLET, EXTENDED RELEASE ORAL at 08:19

## 2024-07-25 ASSESSMENT — ACTIVITIES OF DAILY LIVING (ADL)
ADLS_ACUITY_SCORE: 29

## 2024-07-25 NOTE — PLAN OF CARE
BEH IP Unit Acuity Rating Score (UARS)  Patient is given one point for every criteria they meet.    CRITERIA SCORING   On a 72 hour hold, court hold, committed, stay of commitment, or revocation. 0    Patient LOS on BEH unit exceeds 20 days. 0  LOS: 9   Patient under guardianship, 55+, otherwise medically complex, or under age 11. 0   Suicide ideation without relief of precipitating factors. 0   Current plan for suicide. 0   Current plan for homicide. 0   Imminent risk or actual attempt to seriously harm another without relief of factors precipitating the attempt. 0   Severe dysfunction in daily living (ex: complete neglect for self care, extreme disruption in vegetative function, extreme deterioration in social interactions). 1   Recent (last 7 days) or current physical aggression in the ED or on unit. 0   Restraints or seclusion episode in past 72 hours. 0   Recent (last 7 days) or current verbal aggression, agitation, yelling, etc., while in the ED or unit. 0   Active psychosis. 1   Need for constant or near constant redirection (from leaving, from others, etc).  0   Intrusive or disruptive behaviors. 0   Patient requires 3 or more hours of individualized nursing care per 8-hour shift (i.e. for ADLs, meds, therapeutic interventions). 0   TOTAL 2

## 2024-07-25 NOTE — PLAN OF CARE
Problem: Sleep Disturbance  Goal: Adequate Sleep/Rest  Outcome: Progressing   Data: Patient slept 6.25 hours last night.     No interventions needed.     Remains on elopement, assault and sexual precautions.     Respirations even and unlabored on status 15 checks. Will continue to monitor and report to oncoming staff.     Response: Report sleep hours to day shift. Continue to monitor patient and provide therapeutic interventions as necessary.

## 2024-07-25 NOTE — PLAN OF CARE
Team Note Due:  Tuesday    Assessment/Intervention/Current Symtoms and Care Coordination:  Chart review and met with team, discussed pt progress, symptomology, and response to treatment.  Discussed the discharge plan and any potential impediments to discharge.    [facility] received VM from staff with [facility] reporting they received the referral for IRTS and they will be reviewing this.     CTC received email from Sarah at [facility] reporting they received the IRTS referral and this is out for review with their sites.     CTC received VM from Christine Carbajal, she wanted to confirm locations pt is interested in and discuss their new tobacco free policy. CTC made p.c to Christine and discussed this. She will send the referral on to be reviewed.     Discharge Plan or Goal:  Continue stabilization of mental health symptoms and establish a safe discharge plan.      Barriers to Discharge:  Patient requires further psychiatric stabilization due to current symptomology of psychosis as well as medication management.        Referral Status:  [facility] sent referrals to IRTS placements on 7/24:    [facility] 7/25-[place] confirmed they received referral - ph: [phone]  [facility] - 7/25- Confirmed they received referral.  [facility] -7/25- Confirmed they received the referral.     Legal Status:  Voluntary      Contacts:  GRIS FRIAS - Mom- Ph [phone]- KVNG not signed- Pt's mom is also deaf and uses adaptive technology to communicate on the phone.      Oanh Barrett -  - [phone] x [phone]      Upcoming Meetings and Dates/Important Information and next steps:  [facility] to follow up on IRTS referrals.

## 2024-07-25 NOTE — PLAN OF CARE
Rehab Group    Start time: 1400  End time: 1515  Patient time total: 75 minutes    attended full group    #4 attended   Group Type: occupational therapy   Group Topic Covered: balanced lifestyle, cognitive activities, healthy leisure time, and Physical activity   Group Session Detail: OT: Education on physical and intellectual wellness with physical movement (gentle exercise) and interactive social activity (Name 5) to increase concentration, attention to task, symptom management, coping with stress, sharing information about self, listening to others, physical wellness, intellectual wellness, social wellness, and overall well-being     Patient Response/Contribution:  cooperative with task and actively engaged, pleasant, engaged   Patient Detail: Pt sat among peers to complete presented activity and engaged in reciprocal social interactions with peers and therapist. Pt engaged in all physical movement activities and answered all cognitive questions. Pt verbalized understanding of importance of physical and cognitive wellness in a healthy lifestyle.      Train & Education Service Per Session 45 + Minutes () OT Group code    Patient Active Problem List   Diagnosis    Bipolar disorder, unspecified (H)    Psychosis (H)    Substance abuse (H)    Acute psychosis (H)

## 2024-07-25 NOTE — PROGRESS NOTES
[facility],  Psychiatric Progress Note        Interim History:   The patient's care was discussed with the treatment team and chart notes were reviewed.     Today during the interview with the treatment team staff report patient was attending groups and he slept 6.5 hours overnight. He has been calm and cooperative, medication complaint.     Today patient was met in room he was calm and cooperative. He reports auditory hallucinations are decreased but remain present. He is able to focus conversation.  At times he hears intrusive thoughts but not commanding. He denies suicidal ideations or homicidal ideations. His mood has improved. He denies depression or anxiety. He states sleep adequate. He has not needed prn medications and has been medication complaint. He denies pain or other medical concerns. He denies side effects to current medications. He denies EPS symptoms. He reports is starting to feel ready for discharge.     The 10 point Review of Systems is negative other than noted in the HPI       DIagnoses:     Unspecified psychosis    Clinically Significant Risk Factors                                   # Financial/Environmental Concerns: unable to afford rent/mortgage;unemployed;unable to afford food (Pt reports that his family has been having difficulty paying rent and purchasing food. He would like to work but has been unable to at present due to his mental health concerns.)              Plan:   Patient is: voluntary     Routine lab studies reviewed. Unremarkable. UTOX negative.      Monitor for target symptoms.      Provide a safe environment and therapeutic milieu.      Medications: Zyprexa 20 mg at bedtime  Paliperidone 9 mg daily.      Prns: Zyprexa for aggression, hydroxyzine 25-50 for anxiety, Seroquel for sleep. Cogentin 0.5 mg EPS     Encourage group programming as tolerated       Attestation:  Patient has been seen and evaluated by me,  Adolfo Soler,  APRN CNP  The patient was counseled on  nature of illness and treatment plan/options  Care was coordinated with  unit RN    Disposition  Reason for ongoing hospitalization: patient is psychotic  Discharge place: IRTS   Discharge date: < 5 days         Medications:     Current Facility-Administered Medications   Medication Dose Route Frequency Provider Last Rate Last Admin    acetaminophen (TYLENOL) tablet 650 mg  650 mg Oral Q4H PRN Adolfo Soler APRN CNP   650 mg at 07/23/24 1800    Or    acetaminophen (TYLENOL) Suppository 650 mg  650 mg Rectal Q4H PRN Adolfo Soler APRN CNP        benztropine (COGENTIN) tablet 0.5 mg  0.5 mg Oral BID PRN Adolfo Soler APRN CNP        nicotine (NICORETTE) gum 2-4 mg  2-4 mg Buccal Q1H PRN Jose Boucher MD   2 mg at 07/22/24 1615    OLANZapine (zyPREXA) injection 10 mg  10 mg Intramuscular BID PRN Viraj Quiroz MD        OLANZapine (zyPREXA) tablet 20 mg  20 mg Oral At Bedtime Adolfo Soler APRN CNP   20 mg at 07/24/24 1907    OLANZapine zydis (zyPREXA) ODT tab 10 mg  10 mg Oral BID PRN Viraj Quiroz MD   10 mg at 07/22/24 1729    paliperidone ER (INVEGA) 24 hr tablet 9 mg  9 mg Oral Daily Adolfo Soler APRN CNP   9 mg at 07/25/24 0819    polyethylene glycol (MIRALAX) Packet 17 g  17 g Oral Daily Sarah Kim APRN CNP   17 g at 07/25/24 0819    QUEtiapine (SEROquel) tablet 50 mg  50 mg Oral At Bedtime PRN Adolfo Soler APRN CNP   50 mg at 07/24/24 1907    sennosides (SENOKOT) tablet 8.6 mg  8.6 mg Oral BID PRN Adolfo Soler APRN CNP                 Allergies:   No Known Allergies         Psychiatric Examination:   /74 (BP Location: Right arm)   Pulse 95   Temp 98  F (36.7  C) (Temporal)   Resp 16   Wt 68.9 kg (152 lb)   SpO2 98%   Weight is 152 lbs 0 oz  There is no height or weight on file to calculate BMI.    Appearance:  awake, alert, dressed in hospital scrubs, and slightly unkempt  Attitude:  cooperative  Eye Contact:   good  Mood:  good  Affect:  appropriate and in normal range  Speech:  clear, coherent  Psychomotor Behavior:  no evidence of tardive dyskinesia, dystonia, or tics  Thought Process:  illogical at times, organized  Associations:  no loose associations  Thought Content:  no evidence of suicidal ideation or homicidal ideation, auditory hallucinations present, and visual hallucinations present  Insight:  fair  Judgment:  fair  Oriented to:  time, person, and place  Attention Span and Concentration:  fair  Recent and Remote Memory:  fair           Labs:   No results found for this or any previous visit (from the past 24 hour(s)).

## 2024-07-25 NOTE — PLAN OF CARE
Problem: Anxiety Signs/Symptoms  Goal: Optimized Energy Level (Anxiety Signs/Symptoms)  Outcome: Progressing   Goal Outcome Evaluation:          nursing assessment:  Pt evaluation continues. Assessed mood, anxiety, thoughts, and behavior. Is progressing towards goals of clearing voices (still has AH) but states he can manage it. Encourage participation in groups and developing healthy coping skills. Pt denies SI. Refer to daily team meeting notes for individualized plan of care. Eating well. Vitals WDL. Will continue to assess.

## 2024-07-26 PROCEDURE — 250N000013 HC RX MED GY IP 250 OP 250 PS 637: Performed by: PSYCHIATRY & NEUROLOGY

## 2024-07-26 PROCEDURE — 250N000013 HC RX MED GY IP 250 OP 250 PS 637: Performed by: CLINICAL NURSE SPECIALIST

## 2024-07-26 PROCEDURE — 250N000013 HC RX MED GY IP 250 OP 250 PS 637: Performed by: REGISTERED NURSE

## 2024-07-26 PROCEDURE — G0177 OPPS/PHP; TRAIN & EDUC SERV: HCPCS

## 2024-07-26 PROCEDURE — 128N000002 HC R&B CD/MH ADOLESCENT

## 2024-07-26 RX ORDER — HYDROXYZINE HYDROCHLORIDE 25 MG/1
25 TABLET, FILM COATED ORAL EVERY 4 HOURS PRN
Status: DISCONTINUED | OUTPATIENT
Start: 2024-07-26 | End: 2024-08-05 | Stop reason: HOSPADM

## 2024-07-26 RX ADMIN — NICOTINE POLACRILEX 2 MG: 2 GUM, CHEWING BUCCAL at 16:11

## 2024-07-26 RX ADMIN — QUETIAPINE FUMARATE 50 MG: 50 TABLET ORAL at 19:36

## 2024-07-26 RX ADMIN — OLANZAPINE 20 MG: 10 TABLET, FILM COATED ORAL at 19:36

## 2024-07-26 RX ADMIN — HYDROXYZINE HYDROCHLORIDE 25 MG: 25 TABLET ORAL at 13:32

## 2024-07-26 RX ADMIN — ACETAMINOPHEN 650 MG: 325 TABLET, FILM COATED ORAL at 17:18

## 2024-07-26 RX ADMIN — POLYETHYLENE GLYCOL 3350 17 G: 17 POWDER, FOR SOLUTION ORAL at 08:20

## 2024-07-26 RX ADMIN — PALIPERIDONE 9 MG: 3 TABLET, EXTENDED RELEASE ORAL at 08:18

## 2024-07-26 ASSESSMENT — ACTIVITIES OF DAILY LIVING (ADL)
ADLS_ACUITY_SCORE: 29

## 2024-07-26 NOTE — PLAN OF CARE
Problem: Adult Behavioral Health Plan of Care  Goal: Adheres to Safety Considerations for Self and Others  Intervention: Develop and Maintain Individualized Safety Plan  Recent Flowsheet Documentation  Taken 7/26/2024 1000 by Parth Schroeder RN  Safety Measures: safety rounds completed     Problem: Adult Behavioral Health Plan of Care  Goal: Plan of Care Review  Outcome: Progressing   Goal Outcome Evaluation:  Pt was up early, active and visible in the milieu.  Pt attended all group activities.  Pt was alert and oriented x 4, pleasant and cooperative with staff.   VS stable.   Affect is blunted.     Pt reports great appetite, ate about 100% of breakfast and 100% of lunch.     Pt checked in as doing okay, rated anxiety at 5/10 and depression at 3/10 with 10 being worst.     Pt's goal for today was to attend group.  Pt rated overall mood at pleasant.    Endorsed auditory hallucinations.   Pt denies SI/SIB/HI, and visual hallucinations.      Pt requested and received PRN hydrOXYzine HCl tablet 25 mg for anxiety rated  5/10 on a 10 scale.   Pt was medication compliant, denied pain, medication side effects and medical concerns.

## 2024-07-26 NOTE — PLAN OF CARE
"  Rehab Group     Start time: 1015  End time: 1210  Patient time total: 115 minutes     attended full group     #7 attended   Group Type: occupational therapy   Group Topic Covered: cognitive activities, coping skills, healthy leisure time, and Physical activity   Group Session Detail: OT: Education on healthy activity engagement and healthy leisure engagement with creative hands-on endeavor (yarn baskets) to increase concentration, focus, attention to task/detail, decision making, problem solving, frustration tolerance, task follow through, coping with stress, healthy leisure engagement, creative expression, and social engagement    Patient Response/Contribution:  cooperative with task, actively engaged, and Pleasant, Engaged   Patient Detail: Pt reported during check-in that \"having a good breakfast this morning\" is something that is going well for him recently. Pt sat among peers for presented activity but did not engage in social interactions with peers; lack of socialization may have been due to pt and peer's concentration on task. Pt able to follow verbal directions and visual demonstration for familiar hands on creative endeavor. Pt worked in a neat and tidy manner to complete project. Pt able to mostly problem solve issues with project and asked for assistance from therapist when needed; pt receptive to assistance. Pt reported he was proud of the outcome.        Train & Education Service Per Session 45 + Minutes () OT Group code    Patient Active Problem List   Diagnosis    Bipolar disorder, unspecified (H)    Psychosis (H)    Substance abuse (H)    Acute psychosis (H)         "

## 2024-07-26 NOTE — PROGRESS NOTES
Problem: Sleep Disturbance  Goal: Adequate Sleep/Rest  Outcome: Progressing   Goal Outcome Evaluation:        Pt appeared to have slept for 7 hours.  Respirations are even and unlabored.  No medical/safety/behavioral concerns this shift.  No prn administered.  Pt is not on any precautions at this time.

## 2024-07-26 NOTE — PLAN OF CARE
Team Note Due:  Tuesday    Assessment/Intervention/Current Symtoms and Care Coordination:  Chart review and met with team, discussed pt progress, symptomology, and response to treatment.  Discussed the discharge plan and any potential impediments to discharge.    CTC received VM from Luzmaria with [facility] [place]. She wanted to attempt to schedule interview for pt. CTC left VM to return VM for Luzmaria to attempt to schedule this.     Discharge Plan or Goal:  Continue stabilization of mental health symptoms and establish a safe discharge plan.      Barriers to Discharge:  Patient requires further psychiatric stabilization due to current symptomology of psychosis as well as medication management.        Referral Status:  [facility] sent referrals to IRTS placements on 7/24:    [facility] 7/[facility] confirmed they received referral - ph: [phone]  [facility] - 7/25- Confirmed they received referral.  [facility] -7/25- Confirmed they received the referral. - Luzmaria with [facility] would like to interview pt. Ph: [phone]     Legal Status:  Voluntary      Contacts:  GRIS FRIAS - Mom- Ph [phone]- KVNG not signed- Pt's mom is also deaf and uses adaptive technology to communicate on the phone.      Oanh Barrett -  - [phone] x [phone]      Upcoming Meetings and Dates/Important Information and next steps:  CTC to follow up on IRTS referrals.

## 2024-07-26 NOTE — PLAN OF CARE
"  Problem: Depressive Symptoms  Goal: Depressive Symptoms  Description: Signs and symptoms of listed problems will be absent or manageable.  Outcome: Progressing   Goal Outcome Evaluation:          RN ASSESSMENT: Pt pleasant and cooperative. blunted affect. Speech is clear . Described his/her mood as \"pleasant.\" Denies SI/SIB/HI/AVH. Stream of thought clearing, more intact. Slept 8 hours last night. Good appetite. Denies medication issues or acute physical concerns. Visible in milieu, participated in groups, and was socially appropriate with peers. Insight/judgement improving.  Will continue to assess                 "

## 2024-07-26 NOTE — PLAN OF CARE
Problem: Psychotic Symptoms  Goal: Psychotic Symptoms  Description: Signs and symptoms of listed problems will be absent or manageable.  Outcome: Progressing  Flowsheets (Taken 7/26/2024 1658)  Psychotic Symptoms Present:   insight   affect   mood   thought process     Problem: Psychotic Symptoms  Intervention: Psychotic Symptoms  Flowsheets (Taken 7/26/2024 1658)  Psychotic Symptoms:   maintain safety precautions   simple, clear language   maintain safe secure environment   decrease environmental stimulation   encourage participation / independence with adls   establish therapeutic relationship   encourage nutrition and hydration   reality orientation   Goal Outcome Evaluation:       Patient is visible in the milieu, likes watching movies in the lounge with other patients, denies SI,SIB, reports hearing voices that don't bother him, rated anxiety 5/10 and depression 3/10, contracted for safety in the unit.  Patient had good appetite and is compliant with medications.  Patient requested prn nicotine gums.  No behavioral concerns noted.  Will continue to monitor.

## 2024-07-26 NOTE — PLAN OF CARE
BEH IP Unit Acuity Rating Score (UARS)  Patient is given one point for every criteria they meet.    CRITERIA SCORING   On a 72 hour hold, court hold, committed, stay of commitment, or revocation. 0    Patient LOS on BEH unit exceeds 20 days. 0  LOS: 10   Patient under guardianship, 55+, otherwise medically complex, or under age 11. 0   Suicide ideation without relief of precipitating factors. 0   Current plan for suicide. 0   Current plan for homicide. 0   Imminent risk or actual attempt to seriously harm another without relief of factors precipitating the attempt. 0   Severe dysfunction in daily living (ex: complete neglect for self care, extreme disruption in vegetative function, extreme deterioration in social interactions). 1   Recent (last 7 days) or current physical aggression in the ED or on unit. 0   Restraints or seclusion episode in past 72 hours. 0   Recent (last 7 days) or current verbal aggression, agitation, yelling, etc., while in the ED or unit. 0   Active psychosis. 1   Need for constant or near constant redirection (from leaving, from others, etc).  0   Intrusive or disruptive behaviors. 0   Patient requires 3 or more hours of individualized nursing care per 8-hour shift (i.e. for ADLs, meds, therapeutic interventions). 0   TOTAL 2

## 2024-07-27 PROCEDURE — 128N000002 HC R&B CD/MH ADOLESCENT

## 2024-07-27 PROCEDURE — 250N000013 HC RX MED GY IP 250 OP 250 PS 637

## 2024-07-27 PROCEDURE — 250N000013 HC RX MED GY IP 250 OP 250 PS 637: Performed by: REGISTERED NURSE

## 2024-07-27 RX ORDER — IBUPROFEN 200 MG
200-400 TABLET ORAL EVERY 6 HOURS PRN
Status: DISCONTINUED | OUTPATIENT
Start: 2024-07-27 | End: 2024-08-05 | Stop reason: HOSPADM

## 2024-07-27 RX ADMIN — QUETIAPINE FUMARATE 50 MG: 50 TABLET ORAL at 19:04

## 2024-07-27 RX ADMIN — ACETAMINOPHEN 650 MG: 325 TABLET, FILM COATED ORAL at 12:04

## 2024-07-27 RX ADMIN — PALIPERIDONE 9 MG: 3 TABLET, EXTENDED RELEASE ORAL at 09:12

## 2024-07-27 RX ADMIN — IBUPROFEN 400 MG: 200 TABLET, FILM COATED ORAL at 17:19

## 2024-07-27 RX ADMIN — OLANZAPINE 20 MG: 10 TABLET, FILM COATED ORAL at 19:04

## 2024-07-27 ASSESSMENT — ACTIVITIES OF DAILY LIVING (ADL)
ADLS_ACUITY_SCORE: 29

## 2024-07-27 NOTE — PLAN OF CARE
Problem: Psychotic Symptoms  Goal: Psychotic Symptoms  Description: Signs and symptoms of listed problems will be absent or manageable.  Outcome: Progressing  Flowsheets (Taken 7/27/2024 1701)  Psychotic Symptoms Present:   insight   affect   thought process     Problem: Psychotic Symptoms  Intervention: Psychotic Symptoms  Flowsheets (Taken 7/27/2024 1701)  Psychotic Symptoms:   maintain safety precautions   reality orientation   simple, clear language   decrease environmental stimulation   maintain safe secure environment   encourage participation / independence with adls   encourage nutrition and hydration   provide emotional support   establish therapeutic relationship   Goal Outcome Evaluation:       Patient is visible in the milieu,likes watching television in the lounge, reports hearing voices, denies SI,SIB, rated anxiety 5/10 and depression 3/10, contracted for safety in the unit.  Patient had good appetite, compliant with medications.  Patient c/o headache rated 6/10, prn ibuprofen given,patient said it was effective, rated pain 2/10 after prn med.  Will continue to monitor.

## 2024-07-27 NOTE — PLAN OF CARE
"Problem: Sleep Disturbance  Goal: Adequate Sleep/Rest  Outcome: Progressing     Problem: Anxiety Signs/Symptoms  Goal: Optimized Energy Level (Anxiety Signs/Symptoms)  Outcome: Progressing  Intervention: Optimize Energy Level  Recent Flowsheet Documentation  Taken 7/27/2024 0742 by Mushtaq Topete RN  Diversional Activity:   music   television  Activity (Behavioral Health): up ad tio   Goal Outcome Evaluation:    Plan of Care Reviewed With: patient      Pt was visible in the milieu.   Flat affect though brightness upon approach.  Isolative and withdrawn. Pt spent most of the time in the room.   Cooperative at check in    Pt checked in as doing okay  Pt denied SI/Sib/AH/VH currently  Pt endorsed anxiety 5/10- upon waking up. Offered PRN, pt refused and said \"I am okay for now\"  Ate meals in the lounge  PRN tylenol given for pain 7/10. Not effective.     Pt denied any pain  Pt was medication complain though refused Miralax            "

## 2024-07-28 PROCEDURE — 250N000013 HC RX MED GY IP 250 OP 250 PS 637: Performed by: PSYCHIATRY & NEUROLOGY

## 2024-07-28 PROCEDURE — 250N000013 HC RX MED GY IP 250 OP 250 PS 637: Performed by: REGISTERED NURSE

## 2024-07-28 PROCEDURE — 250N000013 HC RX MED GY IP 250 OP 250 PS 637: Performed by: CLINICAL NURSE SPECIALIST

## 2024-07-28 PROCEDURE — 128N000002 HC R&B CD/MH ADOLESCENT

## 2024-07-28 PROCEDURE — 250N000013 HC RX MED GY IP 250 OP 250 PS 637

## 2024-07-28 RX ADMIN — ACETAMINOPHEN 650 MG: 325 TABLET, FILM COATED ORAL at 08:46

## 2024-07-28 RX ADMIN — POLYETHYLENE GLYCOL 3350 17 G: 17 POWDER, FOR SOLUTION ORAL at 08:45

## 2024-07-28 RX ADMIN — QUETIAPINE FUMARATE 50 MG: 50 TABLET ORAL at 19:05

## 2024-07-28 RX ADMIN — HYDROXYZINE HYDROCHLORIDE 25 MG: 25 TABLET ORAL at 08:49

## 2024-07-28 RX ADMIN — OLANZAPINE 20 MG: 10 TABLET, FILM COATED ORAL at 19:05

## 2024-07-28 RX ADMIN — PALIPERIDONE 9 MG: 3 TABLET, EXTENDED RELEASE ORAL at 08:46

## 2024-07-28 RX ADMIN — IBUPROFEN 400 MG: 200 TABLET, FILM COATED ORAL at 12:13

## 2024-07-28 ASSESSMENT — ACTIVITIES OF DAILY LIVING (ADL)
ADLS_ACUITY_SCORE: 29

## 2024-07-28 NOTE — PLAN OF CARE
Problem: Psychotic Symptoms  Goal: Psychotic Symptoms  Description: Signs and symptoms of listed problems will be absent or manageable.  Outcome: Progressing  Flowsheets (Taken 7/28/2024 1639)  Psychotic Symptoms Present:   affect   insight   thought process     Problem: Psychotic Symptoms  Intervention: Psychotic Symptoms  Flowsheets (Taken 7/28/2024 1639)  Psychotic Symptoms:   maintain safety precautions   simple, clear language   maintain safe secure environment   decrease environmental stimulation   encourage participation / independence with adls   establish therapeutic relationship   reality orientation   Goal Outcome Evaluation:       Patient is visible in the milieu, likes watching movies in the lounge, denies SI,SIB,hallucination at this time, rated anxiety 5/10 and depression 2/10, contracted for safety in the unit.  Patient had good appetite,ate 100% of dinner.  Patient took a shower.  Patient remained calm and cooperative.  Patient wanted to take scheduled bedtime medications early.  Patient requested PRN Seroquel for sleep.  Will continue to monitor.

## 2024-07-28 NOTE — PROGRESS NOTES
Problem: Sleep Disturbance  Goal: Adequate Sleep/Rest  Outcome: Progressing   Goal Outcome Evaluation:        Pt appeared to have slept for 6.25 hours.  Respirations are even and unlabored.  No medical/safety/behavioral concerns this shift.  No prn administered.  Pt is on elopement,assault and sexual precautions.

## 2024-07-28 NOTE — PLAN OF CARE
Problem: Anxiety Signs/Symptoms  Goal: Optimized Energy Level (Anxiety Signs/Symptoms)  Outcome: Progressing   Goal Outcome Evaluation:    Pt was up early took a shower, pt was isolative and withdrawn to room.  Pt was alert and oriented x 4, pleasant and cooperative with staff.     VS stable.   Affect is flat.    Pt reports okay appetite, ate about 100% of breakfast and 100% of lunch.     Pt checked in as doing okay, rated anxiety at 3/10 and depression at 5/10 with 10 being worst. Pt denies SI/SIB/HI. Denies visual and auditory hallucinations.     PRN acetaminophen tablet 650 mg for pain rated 8/10 on a 10 scale.   Pt was medication compliant, denied  medication side effects and medical concerns.    No behavioral or safety concerns noted.

## 2024-07-29 PROCEDURE — G0177 OPPS/PHP; TRAIN & EDUC SERV: HCPCS

## 2024-07-29 PROCEDURE — 99232 SBSQ HOSP IP/OBS MODERATE 35: CPT | Performed by: PSYCHIATRY & NEUROLOGY

## 2024-07-29 PROCEDURE — 250N000013 HC RX MED GY IP 250 OP 250 PS 637: Performed by: PSYCHIATRY & NEUROLOGY

## 2024-07-29 PROCEDURE — 250N000013 HC RX MED GY IP 250 OP 250 PS 637

## 2024-07-29 PROCEDURE — 128N000002 HC R&B CD/MH ADOLESCENT

## 2024-07-29 PROCEDURE — 250N000013 HC RX MED GY IP 250 OP 250 PS 637: Performed by: REGISTERED NURSE

## 2024-07-29 PROCEDURE — 250N000013 HC RX MED GY IP 250 OP 250 PS 637: Performed by: CLINICAL NURSE SPECIALIST

## 2024-07-29 RX ORDER — DIPHENHYDRAMINE HCL 25 MG
25 CAPSULE ORAL
Status: DISCONTINUED | OUTPATIENT
Start: 2024-07-29 | End: 2024-07-31

## 2024-07-29 RX ADMIN — POLYETHYLENE GLYCOL 3350 17 G: 17 POWDER, FOR SOLUTION ORAL at 08:33

## 2024-07-29 RX ADMIN — IBUPROFEN 400 MG: 200 TABLET, FILM COATED ORAL at 08:32

## 2024-07-29 RX ADMIN — QUETIAPINE FUMARATE 50 MG: 50 TABLET ORAL at 19:01

## 2024-07-29 RX ADMIN — NICOTINE POLACRILEX 2 MG: 2 GUM, CHEWING BUCCAL at 12:59

## 2024-07-29 RX ADMIN — OLANZAPINE 20 MG: 10 TABLET, FILM COATED ORAL at 19:01

## 2024-07-29 RX ADMIN — PALIPERIDONE 9 MG: 3 TABLET, EXTENDED RELEASE ORAL at 08:32

## 2024-07-29 ASSESSMENT — ACTIVITIES OF DAILY LIVING (ADL)
ADLS_ACUITY_SCORE: 29

## 2024-07-29 NOTE — PLAN OF CARE
BEH IP Unit Acuity Rating Score (UARS)  Patient is given one point for every criteria they meet.    CRITERIA SCORING   On a 72 hour hold, court hold, committed, stay of commitment, or revocation. 0    Patient LOS on BEH unit exceeds 20 days. 0  LOS: 13   Patient under guardianship, 55+, otherwise medically complex, or under age 11. 0   Suicide ideation without relief of precipitating factors. 0   Current plan for suicide. 0   Current plan for homicide. 0   Imminent risk or actual attempt to seriously harm another without relief of factors precipitating the attempt. 0   Severe dysfunction in daily living (ex: complete neglect for self care, extreme disruption in vegetative function, extreme deterioration in social interactions). 1   Recent (last 7 days) or current physical aggression in the ED or on unit. 0   Restraints or seclusion episode in past 72 hours. 0   Recent (last 7 days) or current verbal aggression, agitation, yelling, etc., while in the ED or unit. 0   Active psychosis. 1   Need for constant or near constant redirection (from leaving, from others, etc).  0   Intrusive or disruptive behaviors. 0   Patient requires 3 or more hours of individualized nursing care per 8-hour shift (i.e. for ADLs, meds, therapeutic interventions). 0   TOTAL 2

## 2024-07-29 NOTE — PROGRESS NOTES
"[facility], [place]   Psychiatric Progress Note        Interim History:   The patient's care was discussed with the treatment team during the daily team meeting and/or staff's chart notes were reviewed.  Staff report patient has been doing well. Cooperative on the unit.     The patient reports that he is doing well. Denies SI or HI. Does have recent AH of hearing voices talking about \"stars\" and \"a jungle gym.\" Other comments involve \"house of spaces\" and \"purple and blue diamonds.\" No commands. Did have some VH two days ago but may have been hypnopompic as he was just waking up. Does feel that medications are helpful for these. Sleep is \"all right.\" Does ask about increasing PRN Seroquel. Discussed that he is already on two other scheduled neuroleptics. Reports Tylenol PM working well for him in the past, so open to trying Benadryl. Looking forward to his IRTS interviews.          Medications:     Current Facility-Administered Medications   Medication Dose Route Frequency Provider Last Rate Last Admin    OLANZapine (zyPREXA) tablet 20 mg  20 mg Oral At Bedtime Adolfo Soler APRN CNP   20 mg at 07/28/24 1905    paliperidone ER (INVEGA) 24 hr tablet 9 mg  9 mg Oral Daily Adolfo Soler APRN CNP   9 mg at 07/29/24 0832    polyethylene glycol (MIRALAX) Packet 17 g  17 g Oral Daily Sarah Kim APRN CNP   17 g at 07/29/24 0833          Allergies:   No Known Allergies       Labs:   No results found for this or any previous visit (from the past 24 hour(s)).       Psychiatric Examination:     /72 (BP Location: Right arm, Patient Position: Sitting, Cuff Size: Adult Regular)   Pulse 97   Temp 97.8  F (36.6  C) (Temporal)   Resp 16   Wt 69.6 kg (153 lb 8 oz)   SpO2 99%   Weight is 153 lbs 8 oz  There is no height or weight on file to calculate BMI.  Orthostatic Vitals       None              Appearance: awake, alert and adequately groomed  Attitude:  cooperative  Eye " Contact:  good  Mood:  good  Affect:  mood congruent  Speech:  clear, coherent  Psychomotor Behavior:  no evidence of tardive dyskinesia, dystonia, or tics  Thought Process:  goal oriented  Associations:  no loose associations  Thought Content:  no evidence of suicidal ideation or homicidal ideation and auditory hallucinations present  Insight:  fair  Judgement:  fair  Oriented to:  time, person, and place  Attention Span and Concentration:  intact  Recent and Remote Memory:  intact           Precautions:     Behavioral Orders   Procedures    Assault precautions    Code 1 - Restrict to Unit    Elopement precautions    [facility] Extended Care     Until discharge, Extended Care to offer psychotherapeutic services to mental health patients boarding for admission or stabilization. These services are to include but are not limited to: individual psychotherapy, diagnostic assessment, case management and care planning, safety planning, etc. This may include up to 1 visit per day. If patient is physically located at [facility] or [facility], group psychotherapy up to 2 time per day may be offered.     Routine Programming     As clinically indicated    Sexual precautions     Pt came out of shower and exposed himself to staff.Approach in pairs    Status 15     Every 15 minutes.          DIagnoses:     Psychosis NOS    Clinically Significant Risk Factors                                     # Financial/Environmental Concerns: unable to afford rent/mortgage;unemployed;unable to afford food (Pt reports that his family has been having difficulty paying rent and purchasing food. He would like to work but has been unable to at present due to his mental health concerns.)                     Plan:     1) Continue Zyprexa and Invega.   2) Add Benadryl 25mg at bedtime PRN insomnia.   3) Patient likely to IRTS this week.       Disposition Plan   Reason for ongoing admission: is unable to care for self due to severe psychosis or katja  Discharge  location: IR facility  Discharge Medications: not ordered  Follow-up Appointments: not scheduled  Legal Status: voluntary    Entered by: Chandana Grady MD on July 29, 2024 at 11:04 AM

## 2024-07-29 NOTE — PLAN OF CARE
Team Note Due:  Tuesday    Assessment/Intervention/Current Symtoms and Care Coordination:  Chart review and met with team, discussed pt progress, symptomology, and response to treatment.  Discussed the discharge plan and any potential impediments to discharge.    [facility] made p.c to [employer]. CTC was informed they received the referral and it has been assigned to [facility]. CTC received email from [facility] with requesting additional information. CTC replied to her questions and provided updated clinical documentation.     CTC received VM from Bev with [facility]. She wanted to inform CTC that [facility] from [place] and determine if pt was ok with that prior to moving forward with the interview.     CTC met with pt and discussed [facility] IRTS wanting to interview him. CTC explained that it is about 30 min away from [place], pt would like to interview with them and did have general questions about having a job etc that CTC explained that he can ask in the interview. CTC left VM for Bev with [facility] to update.     Discharge Plan or Goal:  Continue stabilization of mental health symptoms and establish a safe discharge plan.      Barriers to Discharge:  Patient requires further psychiatric stabilization due to current symptomology of psychosis as well as medication management.        Referral Status:  [facility] sent referrals to IRTS placements on 7/24:    [facility] - 7/29 confirmed they received the referral- [facility] confirmed they received referral - ph: [phone]  [facility] [place] - 7/25- Confirmed they received referral.  [facility] -7/25- Confirmed they received the referral. - Bev with [facility] would like to interview pt. Ph: [phone]     Legal Status:  Voluntary      Contacts:  GRIS FRIAS - Mom- Ph [phone]- KVNG not signed- Pt's mom is also deaf and uses adaptive technology to communicate on the phone.       Oanh Barrett -  - [phone] x [phone]      Upcoming Meetings and Dates/Important Information and next steps:  CTC to follow up on IRTS referrals.

## 2024-07-29 NOTE — PLAN OF CARE
"  Rehab Group    Start time: 1315  End time: 1410  Patient time total: 55 minutes     attended full group     #5 attended   Group Type: occupational therapy   Group Topic Covered: cognitive activities, coping skills, and healthy leisure time   Group Session Detail: OT: Education on 8 Dimensions of Wellness and interactive social activity ([place]) to increase concentration, focus, attention to task/detail, task follow through, memory recall, healthy leisure engagement, coping with stress, heathy distraction engagement, cognitive wellness, social wellness, and social engagement    Patient Response/Contribution:  cooperative with task and listened actively   Patient Detail: Pt reported during check-in that \"word puzzles\", specifically word searches is an activity they enjoy to support their intellectual wellness. Pt sat among peers for presented activity and engaged in brief social interactions with peers. Pt able to follow verbal directions and visual demonstration for novel social activity to support intellectual wellness. Pt able to IND identify most responses but asked for assistance when needed. Pt reported they enjoyed the activity and verbalized understanding of importance of intellectual wellness in a healthy lifestyle. Pt thanked therapist for group.      Train & Education Service Per Session 45 + Minutes () OT Group code    Patient Active Problem List   Diagnosis    Bipolar disorder, unspecified (H)    Psychosis (H)    Substance abuse (H)    Acute psychosis (H)         "

## 2024-07-29 NOTE — PLAN OF CARE
Rehab Group     Start time: 1015  End time: 1200  Patient time total: 105 minutes     attended full group     #5 attended   Group Type: OT Clinic   Group Topic Covered: cognitive activities, coping skills, healthy leisure time, and problem solving   Group Session Detail: OT: Education on healthy activity engagement and creative hands-on endeavor (OT clinic) to increase concentration, focus, attention to task/detail, decision making, problem solving, frustration tolerance, task follow through, coping with stress, healthy leisure engagement, creative expression, and social engagement    Patient Response/Contribution:  cooperative with task, socially appropriate, actively engaged, and pleasant, engaged on approach   Patient Detail: Second session of group. See previous note for details.      Train & Education Service Per Session 45 + Minutes () OT Group code         Patient Active Problem List   Diagnosis    Bipolar disorder, unspecified (H)    Psychosis (H)    Substance abuse (H)    Acute psychosis (H)

## 2024-07-29 NOTE — PLAN OF CARE
"  Rehab Group    Start time: 1015  End time: 1200  Patient time total: 105 minutes    attended full group    #5 attended   Group Type: OT Clinic   Group Topic Covered: cognitive activities, coping skills, healthy leisure time, and problem solving   Group Session Detail: OT: Education on healthy activity engagement and creative hands-on endeavor (OT clinic) to increase concentration, focus, attention to task/detail, decision making, problem solving, frustration tolerance, task follow through, coping with stress, healthy leisure engagement, creative expression, and social engagement    Patient Response/Contribution:  cooperative with task, socially appropriate, actively engaged, and pleasant, engaged on approach   Patient Detail: Pt reported during check-in that \"possibly leaving this week\" to an IRTS is something that is going well for him recently. Pt sat among peers to complete familiar hands on creative endeavors but did not engage in social interactions with peers; pt was pleasant and engaged with therapist on approach. Pt worked in a neat and tidy manner to complete projects and recognized when he needed to take a break.       Train & Education Service Per Session 45 + Minutes () OT Group code    Patient Active Problem List   Diagnosis    Bipolar disorder, unspecified (H)    Psychosis (H)    Substance abuse (H)    Acute psychosis (H)         "

## 2024-07-29 NOTE — PROGRESS NOTES
Problem: Sleep Disturbance  Goal: Adequate Sleep/Rest  Outcome: Progressing   Goal Outcome Evaluation:        Pt appeared to have slept for 7 hours.  Respirations are even and unlabored.  No medical/safety/behavioral concerns this shift.  No prn administered.  Pt is on elopement,assault and sexual precautions.

## 2024-07-29 NOTE — PLAN OF CARE
Problem: Adult Behavioral Health Plan of Care  Goal: Plan of Care Review  Outcome: Progressing     Problem: Depressive Symptoms  Goal: Depressive Symptoms  Description: Signs and symptoms of listed problems will be absent or manageable.  Outcome: Progressing   Goal Outcome Evaluation:    Pt was up early, active and isolative/withdrawn socially.    Pt was alert and oriented x 4, pleasant and cooperative with staff.     VS stable.   Affect is flat.    Pt reports okay appetite, ate about 100% of breakfast and 100% of lunch with adequate intake.   Pt checked in as doing okay, rated anxiety at 1/10 and depression at 2/10 with 10 being worst.     Pt rated overall mood at pleasant and calm.    Pt denies SI/SIB/HI.   Denies visual and auditory hallucinations.   Pt's goal for today was to attend group activities.3    PRN nicotine gum 4 mg.     Pt was medication compliant, denied pain, medication side effects and medical concerns.

## 2024-07-30 PROCEDURE — 99233 SBSQ HOSP IP/OBS HIGH 50: CPT | Performed by: NURSE PRACTITIONER

## 2024-07-30 PROCEDURE — G0177 OPPS/PHP; TRAIN & EDUC SERV: HCPCS

## 2024-07-30 PROCEDURE — 250N000013 HC RX MED GY IP 250 OP 250 PS 637: Performed by: NURSE PRACTITIONER

## 2024-07-30 PROCEDURE — 250N000013 HC RX MED GY IP 250 OP 250 PS 637: Performed by: REGISTERED NURSE

## 2024-07-30 PROCEDURE — 250N000013 HC RX MED GY IP 250 OP 250 PS 637: Performed by: CLINICAL NURSE SPECIALIST

## 2024-07-30 PROCEDURE — 128N000002 HC R&B CD/MH ADOLESCENT

## 2024-07-30 PROCEDURE — H2032 ACTIVITY THERAPY, PER 15 MIN: HCPCS

## 2024-07-30 PROCEDURE — 90853 GROUP PSYCHOTHERAPY: CPT

## 2024-07-30 RX ORDER — QUETIAPINE FUMARATE 25 MG/1
25 TABLET, FILM COATED ORAL
Status: DISCONTINUED | OUTPATIENT
Start: 2024-07-30 | End: 2024-07-31

## 2024-07-30 RX ORDER — OLANZAPINE 10 MG/1
10 TABLET, ORALLY DISINTEGRATING ORAL DAILY PRN
Status: DISCONTINUED | OUTPATIENT
Start: 2024-07-30 | End: 2024-08-05 | Stop reason: HOSPADM

## 2024-07-30 RX ORDER — OLANZAPINE 10 MG/2ML
10 INJECTION, POWDER, FOR SOLUTION INTRAMUSCULAR DAILY PRN
Status: DISCONTINUED | OUTPATIENT
Start: 2024-07-30 | End: 2024-08-05 | Stop reason: HOSPADM

## 2024-07-30 RX ADMIN — POLYETHYLENE GLYCOL 3350 17 G: 17 POWDER, FOR SOLUTION ORAL at 08:50

## 2024-07-30 RX ADMIN — OLANZAPINE 20 MG: 10 TABLET, FILM COATED ORAL at 20:00

## 2024-07-30 RX ADMIN — PALIPERIDONE 9 MG: 3 TABLET, EXTENDED RELEASE ORAL at 08:50

## 2024-07-30 RX ADMIN — QUETIAPINE FUMARATE 25 MG: 25 TABLET ORAL at 21:02

## 2024-07-30 ASSESSMENT — ACTIVITIES OF DAILY LIVING (ADL)
ADLS_ACUITY_SCORE: 29
HYGIENE/GROOMING: INDEPENDENT
ADLS_ACUITY_SCORE: 29

## 2024-07-30 NOTE — PLAN OF CARE
Group Attendance:  attended full group    Time session began: 1315  Time session ended: 1415  Patient's total time in group: 60    Total # Attendees   4   Group Type psychotherapeutic and psychoeducational     Group Topic Covered insight improvement and healthy coping skills     Group Session Detail Participants discussed the common losses in life and the natural process of grief. We discussed the stages of grief, facts about grief and the process of mourning.      Patient's response to the group topic/interactions:  cooperative with task, organized, socially appropriate, listened actively, and actively engaged     Patient Details: Patient shared openly about the death of his best friend and how it impacted him. He shared that there was not a role model in his life and he isn't sure how to process his feelings. He accepts what has happened.           [id number] - Group psychotherapy - 1 Session    Patient Active Problem List   Diagnosis    Bipolar disorder, unspecified (H)    Psychosis (H)    Substance abuse (H)    Acute psychosis (H)

## 2024-07-30 NOTE — PLAN OF CARE
Rehab Group    Start time: 1015  End time: 1108  Patient time total: 52 minutes    attended full group    #4 attended   Group Type: occupational therapy   Group Topic Covered: balanced lifestyle, cognitive activities, coping skills, healthy leisure time, problem solving, and social skills       Group Session Detail:  OT CLINIC     Patient Response/Contribution:  cooperative with task, organized, and actively engaged       Patient Detail:    Occupational therapy clinic to facilitate coping skill exploration, creative expression within personally meaningful activities, and clinical observation of social, cognitive, and kinesthetic performance skills. Pt response: Pt presented with neutral affect, calm mood. IND to initiate, gather materials, sequence, and adjust to workspace demands as needed for a cognitive challenge worksheet. IND With all performance skills. Demonstrated good sustained attention toward task. Pt did not socialize with peers/staff but appeared comfortable asking for assistance gathering restricted supplies (I.e. pencil and scratch stick) when needed and safely utilized items.         Train & Education Service Per Session 45 + Minutes () OT Group code    Patient Active Problem List   Diagnosis    Bipolar disorder, unspecified (H)    Psychosis (H)    Substance abuse (H)    Acute psychosis (H)

## 2024-07-30 NOTE — PROGRESS NOTES
07/30/24 1226   Individualization/Patient Specific Goals   Patient Personal Strengths resilient   Patient Vulnerabilities adverse childhood experience(s);family/relationship conflict;occupational insecurity   Interprofessional Rounds   Summary Pt continuing to stabilize, per treatment team pt denying mental health symptoms today. Discharge plan discussed.   Participants advanced practice nurse;CTC;nursing;OT;psychotherapy   Behavioral Team Discussion   Participants Stephany Rojas RN, Nasima OT, Woody CTC and Deya CTC therapist.   Progress Moderate progress   Anticipated length of stay 3-5 days or until placement located.   Continued Stay Criteria/Rationale Stabilization of psychosis symptoms. Medication management.   Medical/Physical See H&P   Precautions See below   Plan Stabilize mental health symptoms and establish a safe discharge plan.   Safety Plan Safe secure milieu   Anticipated Discharge Disposition IRTS     PRECAUTIONS AND SAFETY    Behavioral Orders   Procedures    Assault precautions    Code 1 - Restrict to Unit    Elopement precautions    [facility] Extended Care     Until discharge, Extended Care to offer psychotherapeutic services to mental health patients boarding for admission or stabilization. These services are to include but are not limited to: individual psychotherapy, diagnostic assessment, case management and care planning, safety planning, etc. This may include up to 1 visit per day. If patient is physically located at [facility] or [facility], group psychotherapy up to 2 time per day may be offered.     Routine Programming     As clinically indicated    Sexual precautions     Pt came out of shower and exposed himself to staff.Approach in pairs    Status 15     Every 15 minutes.       Safety  Safety WDL: WDL  Patient Location: patient room, own  Observed Behavior: calm  Observed Behavior (Comment): watching movies, and attending group  Safety Measures: safety rounds completed  Diversional  Activity: music, television  Assault: status 15, private room, behavioral scrubs (pajamas)  Elopement Assessment:  (No signs of elopement)  Elopement Interventions: status 15, no shoes, room away from unit doors, behavioral scrubs (pajamas), signs posted on unit entrance / exit doors, staff positioned near patient during heightened activity on unit  Sexual: status 15, private room

## 2024-07-30 NOTE — PLAN OF CARE
Group Attendance:  attended full group    Time session began: 1415   Time session ended: 1500  Patient's total time in group: 45    Total # Attendees   2   Group Type psychotherapeutic     Group Topic Covered insight improvement and goals and motivation     Group Session Detail Group members check in with how they are feeling and a success for the day. The group completed a self-care activity to check in with how they are doing with emotional, physical, social, professional, and spiritual self-care. Group discussed each section and what they are doing well and what they might want to work on. Group members set a goal for the week to practice for self-care.     Patient's response to the group topic/interactions:  positive affect, cooperative with task, socially appropriate, safe use of materials/supplies, listened actively, expressed understanding of topic, and actively engaged     Patient Details: Pt attended group and shared that he is feeling ok and trying to stay positive. Pt engaged in the activity, set a goal of talking with a therapist to work on some of his emotional goals.            No Charge (0-15 min) not enough pts for group charge.    Patient Active Problem List   Diagnosis    Bipolar disorder, unspecified (H)    Psychosis (H)    Substance abuse (H)    Acute psychosis (H)

## 2024-07-30 NOTE — PLAN OF CARE
Problem: Adult Behavioral Health Plan of Care  Goal: Plan of Care Review  7/29/2024 2146 by Parth Schroeder RN  Outcome: Progressing  7/29/2024 1250 by Parth Schroeder RN  Outcome: Progressing     Problem: Depressive Symptoms  Goal: Depressive Symptoms  Description: Signs and symptoms of listed problems will be absent or manageable.  7/29/2024 2146 by Parth Schroeder RN  Outcome: Progressing  7/29/2024 1250 by Parth Schroeder RN  Outcome: Progressing   Goal Outcome Evaluation:    Pt was visible the milieu - observed watching Tv with peers. Pt was isolative and withdrawn. Pt request bible and Quran to read while in the lounge area.    Pt was alert and oriented x 4, pleasant and cooperative with staff.     VS stable.   Affect is flat.   Pt reports okay appetite, ate about 100% of dinner.     Pt checked in as doing good, Pt denied pain, anxiety, depression, HI, SIB, visual and auditory hallucinations, and contracted for safety.    Pt requested and received PRN QUEtiapine (SEROquel) tablet 50 mg for sleep.   Pt was medication compliant, denied medication side effects and medical concerns.

## 2024-07-30 NOTE — PROGRESS NOTES
"    Rehab Group    Start time: 1130  End time: 1225  Patient time total: 55 minutes    attended full group     #3 attended   Group Type: dance movement   Group Topic Covered: balanced lifestyle, coping skills, emotional regulation, healthy leisure time, relationship skills/support systems, and social skills       Group Session Detail:  Group explored rhythm for organization, cohesion and self-regulation within a social context.  With strong co-regulation present (two nursing staff, this therapist and three nursing students) pts were able to reach synchronous movement they named as a \"good vibe\" that shifted individual mood and increased participation level.  Some layering of movements allowed for expressions of self-determining life direction with self-soothing.     Patient Response/Contribution:  positive affect, socially appropriate, listened actively, expressed understanding of topic, attentive, and actively engaged       Patient Detail:    Pt engaged with fluid and organized movement expression.  He smiled often and was responsive to peers and this therapist.  He stated he is patiently awaiting his next steps and hopes an upcoming interview with an IRTS will go well.        Group Therapy ([id number])    Patient Active Problem List   Diagnosis    Bipolar disorder, unspecified (H)    Psychosis (H)    Substance abuse (H)    Acute psychosis (H)      "

## 2024-07-30 NOTE — PLAN OF CARE
Team Note Due:  Tuesday    Assessment/Intervention/Current Symtoms and Care Coordination:  Chart review and met with team, discussed pt progress, symptomology, and response to treatment.  Discussed the discharge plan and any potential impediments to discharge.    CTC received email from Steve with [employer] [facility] asking to clarify additional things, CTC replied. Steve reports that they want to move forward with an interview tomorrow at 11am. CTC confirmed this will work.     CTC met with pt and provided update on IRTS referrals/interview.     CTC left  for Bev with [facility] to attempt to determine if she will be calling pt for an IRTS interview.     Discharge Plan or Goal:  Continue stabilization of mental health symptoms and establish a safe discharge plan.      Barriers to Discharge:  Patient requires further psychiatric stabilization due to current symptomology of psychosis as well as medication management.        Referral Status:  [facility] sent referrals to IRTS placements on 7/24:    [facility] - 7/29 confirmed they received the referral- been assigned to [place]   [facility] 7/25-[place] confirmed they received referral - ph: [phone]  [facility] - 7/25- Confirmed they received referral.  [facility] -7/25- Confirmed they received the referral. - Bev with [facility] would like to interview pt. Ph: [phone]     Legal Status:  Voluntary      Contacts:  GRIS RHODAMAR - Mom- Ph [phone]- KVNG not signed- Pt's mom is also deaf and uses adaptive technology to communicate on the phone.      Oanh Barrett -  - [phone] x [phone]      Upcoming Meetings and Dates/Important Information and next steps:  CTC to follow up on IRTS referrals.     7/31- IRTS interview with [facility] over the phone.

## 2024-07-30 NOTE — PLAN OF CARE
"  Problem: Adult Inpatient Plan of Care  Goal: Plan of Care Review  Description: The Plan of Care Review/Shift note should be completed every shift.  The Outcome Evaluation is a brief statement about your assessment that the patient is improving, declining, or no change.  This information will be displayed automatically on your shift  note.  Outcome: Progressing   Goal Outcome Evaluation:    Patient up for breakfast, medications.  Pleasant and cooperative.  Affect is full range.  Good eye contact.  Attended group.    Reports feeling \"ok\".  Denies SI/SIB or thoughts to hurt others.  Denies depression.  Reports anxiety as \"a little\".  Denies hallucinations.  Thinking is linear and organized.    Asking about discharge disposition--and informed that he has an interview with [facility] tomorrow (7/31) at 1100 by phone.    Denies concerns regarding sleep, appetite, medication side effects, constipation.  "

## 2024-07-30 NOTE — PROGRESS NOTES
"PSYCHIATRY  PROGRESS NOTE     DATE OF SERVICE   07/30/2024  [facility], [place]   Psychiatric Progress Note  Hospital Day: 21       CHIEF COMPLAINT   \"The voices are a little better.\"       SUBJECTIVE    reports: Referrals out to IRTS placements; Voluntary     HPI/Reason for admission: Per DEC assessment completed on 7/9/2024:   \" Upon assessment, pt speaks barely above a whisper and requires several prompts in order for this writer to clearly understand him. He tells this writer that he has been extensively researching \"angelology\" and talks at length about various Mu-ism references, including seraphims and cherubims. He endorses auditory and visual hallucinations. When asked about their content, pt tells this writer that the voices are talking to him about the Bible \"all the time\" as well as about \"[place]\" and \"apprenticeship\". He reports hearing \"[place] languages\" and says that he has been speaking to a ghost. He states that the voices are also sometimes negative in nature, such as telling him that his mother is being harmed. Pt tells this writer that he has also been experiencing \"visions\" of snakes, animals, crystals, and eggs. He has seen himself be eaten by a shark as well as someone stab him. Pt lives with his mother and exhibits significant paranoia toward their roommate. He believes that their roommate has been using her \"eyes for witchcraft\" to terrify his mother. He also thinks that she has potentially been coughing to hypnotize his mother. Pt believes that their roommate is trying to trick him and has been looking at him \"like a robot\". Pt notes that he did recently threaten their roommate as he was concerned for his mother's safety. He reports having a pending terroristic threat charge related to this with court on 7/24. He denies any HI toward his roommate or others at present. When asked about SI, pt reports thinking about going to [facility] but denies a " "plan or intent for suicide. Pt tells this writer that he has been sleeping and eating very minimally. When asked about substance use, he reports recently drinking a Four Loco and using psilocybin mushrooms earlier this year. He states that he is not using psilocybin mushrooms at present but did \"take a lot\" earlier this year. He is worried that he may have caused harm to himself from his psilocybin use. Pt tells this writer that he has \"no clue what is going on\" at present but that he is \"sane\" and that his only goal is to protect his mother and go back to work to provide for her. He notes that his family has been having difficulty affording rent and food lately.\"       OBJECTIVE   Writer introduced self as providing cross coverage for previously attending psychiatrist.  Patient provide and was friendly and forthcoming with writer.  Patient reports decreased auditory hallucinations however states that they are still present on and off throughout the day.  Patient has been recording what the voices say in a journal.  Patient showed writer the journal which contained disorganized nonsensical random words and letters.  Patient denies side effects from current medications/none observed.  Patient understanding that referrals for IRTS placement have been made and that he has a phone interview tomorrow.  Patient denies command hallucinations.  Patient denies suicidal/homicidal ideation/intent/plan.  Denies pain/physical complaints.     MEDICATIONS   Medications:  Scheduled Meds:  Current Facility-Administered Medications   Medication Dose Route Frequency Provider Last Rate Last Admin    OLANZapine (zyPREXA) tablet 20 mg  20 mg Oral At Bedtime Adolfo Soler APRN CNP   20 mg at 07/29/24 1901    paliperidone ER (INVEGA) 24 hr tablet 9 mg  9 mg Oral Daily Adolfo Soler APRN CNP   9 mg at 07/29/24 0832    polyethylene glycol (MIRALAX) Packet 17 g  17 g Oral Daily Sarah Kim APRN CNP   17 g at 07/29/24 0833 " "    Continuous Infusions:  Current Facility-Administered Medications   Medication Dose Route Frequency Provider Last Rate Last Admin     PRN Meds:.  Current Facility-Administered Medications   Medication Dose Route Frequency Provider Last Rate Last Admin    acetaminophen (TYLENOL) tablet 650 mg  650 mg Oral Q4H PRN Adolfo Soler APRN CNP   650 mg at 07/28/24 0846    Or    acetaminophen (TYLENOL) Suppository 650 mg  650 mg Rectal Q4H PRN Adolfo Soler APRN CNP        benztropine (COGENTIN) tablet 0.5 mg  0.5 mg Oral BID PRN Adolfo Soler APRN CNP        diphenhydrAMINE (BENADRYL) capsule 25 mg  25 mg Oral At Bedtime PRN Chandana Grady MD        hydrOXYzine HCl (ATARAX) tablet 25 mg  25 mg Oral Q4H PRN Chandana Grady MD   25 mg at 07/28/24 0849    ibuprofen (ADVIL/MOTRIN) tablet 200-400 mg  200-400 mg Oral Q6H PRN Tova Mendez APRN CNP   400 mg at 07/29/24 0832    nicotine (NICORETTE) gum 2-4 mg  2-4 mg Buccal Q1H PRN Jose Boucher MD   2 mg at 07/29/24 1259    OLANZapine (zyPREXA) injection 10 mg  10 mg Intramuscular BID PRN Viraj Quiroz MD        OLANZapine zydis (zyPREXA) ODT tab 10 mg  10 mg Oral BID PRN Viraj Quiroz MD   10 mg at 07/22/24 1729    QUEtiapine (SEROquel) tablet 50 mg  50 mg Oral At Bedtime PRN Adolfo Soler APRN CNP   50 mg at 07/29/24 1901    sennosides (SENOKOT) tablet 8.6 mg  8.6 mg Oral BID PRN Adolfo Soler APRN CNP           Medication adherence issues: MS Med Adherence Y/N: No  Medication side effects: MEDICATION SIDE EFFECTS: no side effects reported  Benefit: Yes / No: Yes       ROS   Pertinent items are noted in HPI.       MENTAL STATUS EXAM   Vitals: /87 (BP Location: Right arm, Patient Position: Sitting, Cuff Size: Adult Regular)   Pulse 99   Temp 98.1  F (36.7  C) (Temporal)   Resp 18   Wt 69.6 kg (153 lb 8 oz)   SpO2 99%     Appearance:  Disheveled  Mood:  {Mood: \"good\"  Affect: full range  was congruent to " "speech  Suicidal Ideation: PRESENT / ABSENT: absent   Homicidal Ideation: PRESENT / ABSENT: absent   Thought process: disorganized   Thought content: significant for  Auditory hallucinations .   Fund of Knowledge: Average  Attention/Concentration: Fair  Language ability:  Intact  Memory:  Immediate recall intact  Insight:  fair.  Judgement: fair  Orientation: Yes, x4  Psychomotor Behavior: normal or unremarkable    Muscle Strength and Tone: MuscleStrength: Normal  Gait and Station: Normal       LABS   personally reviewed.     No results found for: \"PHENYTOIN\", \"PHENOBARB\", \"VALPROATE\", \"CBMZ\"       DIAGNOSIS   Principal Problem:    Psychosis NOS    Active Problem List:  Patient Active Problem List   Diagnosis    Bipolar disorder, unspecified (H)    Psychosis (H)    Substance abuse (H)    Acute psychosis (H)          PLAN   Plan as per previously attending psychiatrist, Dr. Miguel A MD, dated 7/29/24:     1) Continue Zyprexa and Invega.   2) Add Benadryl 25mg at bedtime PRN insomnia.   3) Patient likely to IRTS this week.      Disposition Plan  Reason for ongoing admission: is unable to care for self due to severe psychosis or katja  Discharge location: IRTS facility  Discharge Medications: not ordered  Follow-up Appointments: not scheduled  Legal Status: voluntary    Psychiatric coverage provided by PHI Akbar CNP beginning on 07/30/24.    07/30/24: Decrease Seroquel to 25 mg q HS PRN      Risk Assessment: IP MHAC RISK ASSESSMENT: Patient on precautions    Coordination of Care:   Treatment Plan reviewed and physician signed, Care discussed with Care/Treatment Team Members, Chart reviewed and Patient seen      Re-Certification I certify that the inpatient psychiatric facility services furnished since the previous certification were, and continue to be, medically necessary for, either, treatment which could reasonably be expected to improve the patient s condition or diagnostic study and that the " hospital records indicate that the services furnished were, either, intensive treatment services, admission and related services necessary for diagnostic study, or equivalent services.     I certify that the patient continues to need, on a daily basis, active treatment furnished directly by or requiring the supervision of inpatient psychiatric facility personnel.   I estimate 5-7 days of hospitalization is necessary for proper treatment of the patient. My plans for post-hospital care for this patient are  IR facility     PHI Akbar CNP    -     07/30/2024  -     8:35 AM    Total time  60 minutes with > 50%spent on coordination of cares and psycho-education.    This note was created with help of Dragon dictation system. Grammatical / typing errors are not intentional.    PHI Akbar CNP

## 2024-07-30 NOTE — PLAN OF CARE
BEH IP Unit Acuity Rating Score (UARS)  Patient is given one point for every criteria they meet.    CRITERIA SCORING   On a 72 hour hold, court hold, committed, stay of commitment, or revocation. 0    Patient LOS on BEH unit exceeds 20 days. 0  LOS: 14   Patient under guardianship, 55+, otherwise medically complex, or under age 11. 0   Suicide ideation without relief of precipitating factors. 0   Current plan for suicide. 0   Current plan for homicide. 0   Imminent risk or actual attempt to seriously harm another without relief of factors precipitating the attempt. 0   Severe dysfunction in daily living (ex: complete neglect for self care, extreme disruption in vegetative function, extreme deterioration in social interactions). 1   Recent (last 7 days) or current physical aggression in the ED or on unit. 0   Restraints or seclusion episode in past 72 hours. 0   Recent (last 7 days) or current verbal aggression, agitation, yelling, etc., while in the ED or unit. 0   Active psychosis. 1   Need for constant or near constant redirection (from leaving, from others, etc).  0   Intrusive or disruptive behaviors. 0   Patient requires 3 or more hours of individualized nursing care per 8-hour shift (i.e. for ADLs, meds, therapeutic interventions). 0   TOTAL 2

## 2024-07-30 NOTE — PLAN OF CARE
Rehab Group    Start time: 1415  End time: 1500  Patient time total: 45 minutes    attended full group    #2 attended   Group Type: occupational therapy   Group Topic Covered: balanced lifestyle, cognitive activities, healthy leisure time, and problem solving       Group Session Detail:  Cognitive Leisure Task     Patient Response/Contribution:  cooperative with task, organized, socially appropriate, and actively engaged       Patient Detail:    Leisure exploration and cognitive task to facilitate social and leisure engagement. Pt Response: Pt was able to follow 3 step directions of the novel task after an initial explanation and demonstration. Pt demonstrated good sequencing and planning ahead, and concentrated for the full duration of group. Strategic problem solving improved over the course of the group.          Train & Education Service Per Session 45 + Minutes () OT Group code    Patient Active Problem List   Diagnosis    Bipolar disorder, unspecified (H)    Psychosis (H)    Substance abuse (H)    Acute psychosis (H)

## 2024-07-30 NOTE — PLAN OF CARE
Problem: Psychotic Symptoms  Goal: Psychotic Symptoms  Description: Signs and symptoms of listed problems will be absent or manageable.  Outcome: Not Progressing  Flowsheets (Taken 7/30/2024 2385)  Psychotic Symptoms Assessed: all  Psychotic Symptoms Present: none   Goal Outcome Evaluation:  /79 (BP Location: Right arm, Patient Position: Sitting, Cuff Size: Adult Regular)   Pulse 95   Temp 97.8  F (36.6  C) (Oral)   Resp 18   Wt 71.4 kg (157 lb 4.8 oz)   SpO2 99%   Pt was visible in the milieu watching TV with other pts. He was calm, cooperative and medication compliant. Good appetite and fluid intake and ate 100% of his dinner. Pt was cleaned and well groomed and took a shower. His mom came to visit and the visit went well. Pt denied having SI/SIB/AH/VH and anxiety. Pt requested Seroquel 25mg for sleep and gave per pharmacist after 40min giving Zyprexa 20mg. He participated in a group activity.

## 2024-07-31 ENCOUNTER — DOCUMENTATION ONLY (OUTPATIENT)
Dept: BEHAVIORAL HEALTH | Facility: CLINIC | Age: 24
End: 2024-07-31
Payer: MEDICAID

## 2024-07-31 PROCEDURE — 97150 GROUP THERAPEUTIC PROCEDURES: CPT | Mod: GO

## 2024-07-31 PROCEDURE — 128N000002 HC R&B CD/MH ADOLESCENT

## 2024-07-31 PROCEDURE — 250N000013 HC RX MED GY IP 250 OP 250 PS 637: Performed by: NURSE PRACTITIONER

## 2024-07-31 PROCEDURE — 250N000013 HC RX MED GY IP 250 OP 250 PS 637: Performed by: REGISTERED NURSE

## 2024-07-31 PROCEDURE — 250N000013 HC RX MED GY IP 250 OP 250 PS 637

## 2024-07-31 PROCEDURE — 99232 SBSQ HOSP IP/OBS MODERATE 35: CPT | Performed by: NURSE PRACTITIONER

## 2024-07-31 PROCEDURE — 250N000013 HC RX MED GY IP 250 OP 250 PS 637: Performed by: CLINICAL NURSE SPECIALIST

## 2024-07-31 RX ORDER — POLYETHYLENE GLYCOL 3350 17 G/17G
17 POWDER, FOR SOLUTION ORAL DAILY
Qty: 510 G | Refills: 0 | Status: SHIPPED | OUTPATIENT
Start: 2024-07-31 | End: 2024-08-30

## 2024-07-31 RX ORDER — PHENOL 1.4 %
10 AEROSOL, SPRAY (ML) MUCOUS MEMBRANE AT BEDTIME
Qty: 30 TABLET | Refills: 0 | Status: SHIPPED | OUTPATIENT
Start: 2024-07-31 | End: 2024-08-30

## 2024-07-31 RX ORDER — DIPHENHYDRAMINE HCL 50 MG
50 CAPSULE ORAL
Qty: 30 CAPSULE | Refills: 0 | Status: SHIPPED | OUTPATIENT
Start: 2024-07-31 | End: 2024-08-30

## 2024-07-31 RX ORDER — HYDROXYZINE HYDROCHLORIDE 25 MG/1
25 TABLET, FILM COATED ORAL EVERY 4 HOURS PRN
Qty: 30 TABLET | Refills: 0 | Status: SHIPPED | OUTPATIENT
Start: 2024-07-31 | End: 2024-08-30

## 2024-07-31 RX ORDER — PALIPERIDONE 9 MG/1
9 TABLET, EXTENDED RELEASE ORAL DAILY
Qty: 30 TABLET | Refills: 0 | Status: SHIPPED | OUTPATIENT
Start: 2024-08-01 | End: 2024-08-31

## 2024-07-31 RX ORDER — BENZTROPINE MESYLATE 0.5 MG/1
0.5 TABLET ORAL 2 TIMES DAILY PRN
Qty: 10 TABLET | Refills: 0 | Status: SHIPPED | OUTPATIENT
Start: 2024-07-31 | End: 2024-08-30

## 2024-07-31 RX ORDER — OLANZAPINE 20 MG/1
20 TABLET ORAL AT BEDTIME
Qty: 30 TABLET | Refills: 0 | Status: SHIPPED | OUTPATIENT
Start: 2024-07-31 | End: 2024-08-30

## 2024-07-31 RX ORDER — DIPHENHYDRAMINE HCL 25 MG
50 CAPSULE ORAL
Status: DISCONTINUED | OUTPATIENT
Start: 2024-07-31 | End: 2024-08-05 | Stop reason: HOSPADM

## 2024-07-31 RX ADMIN — OLANZAPINE 20 MG: 10 TABLET, FILM COATED ORAL at 19:00

## 2024-07-31 RX ADMIN — Medication 10 MG: at 19:39

## 2024-07-31 RX ADMIN — ACETAMINOPHEN 650 MG: 325 TABLET, FILM COATED ORAL at 19:38

## 2024-07-31 RX ADMIN — PALIPERIDONE 9 MG: 3 TABLET, EXTENDED RELEASE ORAL at 08:09

## 2024-07-31 RX ADMIN — POLYETHYLENE GLYCOL 3350 17 G: 17 POWDER, FOR SOLUTION ORAL at 08:09

## 2024-07-31 RX ADMIN — DIPHENHYDRAMINE HYDROCHLORIDE 50 MG: 25 CAPSULE ORAL at 19:39

## 2024-07-31 RX ADMIN — IBUPROFEN 400 MG: 200 TABLET, FILM COATED ORAL at 14:10

## 2024-07-31 ASSESSMENT — ACTIVITIES OF DAILY LIVING (ADL)
ADLS_ACUITY_SCORE: 29

## 2024-07-31 NOTE — PLAN OF CARE
Team Note Due:  Tuesday    Assessment/Intervention/Current Symtoms and Care Coordination:  Chart review and met with team, discussed pt progress, symptomology, and response to treatment.  Discussed the discharge plan and any potential impediments to discharge.    Pt participated in interview with [facility]. CTC met with pt, pt reports the interview went well and that they told him that they will contact [facility].     [facility] received VM from [facility] wanting to confirm pt still needs placement. CTC made p.c to [facility] and confirmed this. Pt still on waiting list.     [facility] received email from Steve with [facility]. Steve reports they have a bed for pt at their [facility] location in [place] for pt to admit there on 8/2.     CTC notified provider and pt. [facility] discussed follow up services and appointments with pt. Pt agreeable to psychiatry appointment and would like a referral for MH CM with [facility]. Pt signed KV[facility] for [facility] and [facility].     [facility] tasked care coordinators with scheduling psychiatry and transportation.   [facility] left VM for [facility]  department and requested information on making MH CM referral. [facility] received returned p.c from Gris with [facility], she transferred CTC to the Intake screener [facility] to make referral and CTC left VM for [facility]. CTC received p.c from [facility], she reports that if pt is discharging to IR placement they will not open with pt at this time. She reports that pt would need to call in to complete screening around day 45. [facility] added this information to AVS.     Discharge Plan or Goal:  Continue stabilization of mental health symptoms and establish a safe discharge plan.      Dispo Plan: [facility]  on 8/2/24  Transportation: Medical cab ride to be scheduled to  at 9am.   Provisional discharge required: No   AVS complete: No    Barriers to Discharge:  Patient requires further psychiatric stabilization due to current  symptomology of psychosis as well as medication management.     Referral Status:  CTC sent referrals to IRTS placements on 7/24:    [facility] - 7/29 confirmed they received the referral- been assigned to [facility]. 7/31- Pt accepted to and going to [facility] on 8/2     Legal Status:  Voluntary      Contacts:  [facility] - Mom- Ph [phone]- KVNG not signed- Pt's mom is also deaf and uses adaptive technology to communicate on the phone.      Oanh Barrett -  - [phone]      Upcoming Meetings and Dates/Important Information and next steps:  CTC to ensure psychiatry appt and transportation is scheduled.

## 2024-07-31 NOTE — PLAN OF CARE
Rehab Group    Start time: 1015  End time: 1105  Patient time total: 50 minutes    attended full group    #5 attended   Group Type: occupational therapy   Group Topic Covered: balanced lifestyle, mindfulness, relaxation , and self-care       Group Session Detail:  Mental Health management      Patient Response/Contribution:  cooperative with task, organized, socially appropriate, expressed understanding of topic, and actively engaged       Patient Detail:    Mental health management group focused on developing insight into supports, needs, and life struggles via a calming guided watercolor and safe containment. Pt Response: Pt demonstrated understanding of the activity by identifying past and current barriers and plans for the future.        [id number] OT Group (2 or more in attendance)      Patient Active Problem List   Diagnosis    Bipolar disorder, unspecified (H)    Psychosis (H)    Substance abuse (H)    Acute psychosis (H)

## 2024-07-31 NOTE — PLAN OF CARE
"  Rehab Group    Start time: 1315  End time: 1415  Patient time total: 45 minutes    attended full group    #5 attended   Group Type: occupational therapy   Group Topic Covered: balanced lifestyle, coping skills, and self-esteem       Group Session Detail:  Self-esteem Promotion      Patient Response/Contribution:  cooperative with task, organized, socially appropriate, and actively engaged       Patient Detail:    Mental Health Management group with a focus on self-esteem and healthy routine building. Pts were instructed through a multi-step project of creating a positive affirmations monthly calendar. Purpose of the activity: Establishing healthy habits and routine, reality orientation, to promote sense of hope, coping and observation of follow through with a multi-step task. Pt Response: Pt had some difficulty answering the check in question r/t their favorite self-esteem building activity/practice, appearing unsure what this meant but with support and examples, pt stated \"talking with my family\". Pt engaged in both discussive and task portions of group. Pt able to follow multi-step verbal instruction and remained IND with all task demands.        [id number] OT Group (2 or more in attendance)      Patient Active Problem List   Diagnosis    Bipolar disorder, unspecified (H)    Psychosis (H)    Substance abuse (H)    Acute psychosis (H)       "

## 2024-07-31 NOTE — PLAN OF CARE
Problem: Psychotic Symptoms  Goal: Social and Therapeutic (Psychotic Symptoms)  Description: Signs and symptoms of listed problems will be absent or manageable.  Outcome: Progressing  Flowsheets (Taken 7/31/2024 1833)  Psychotic Symptoms Assessed: all   Goal Outcome Evaluation:  /73 (BP Location: Left arm, Patient Position: Sitting, Cuff Size: Adult Regular)   Pulse 98   Temp 97.9  F (36.6  C) (Oral)   Resp 16   Wt 71.4 kg (157 lb 4.8 oz)   SpO2 98%       Pt was visible in the milieu social with other pt watching TV. He was calm, cooperative and medication compliant. Denied having pain discomfort and medication side effects. Pt had good appetite and fluid intake. Pt denied having SI/SIB/AH/VH and anxiety. Pt will discharged on 8/2 to IR. Pt had full affect with bright insight. Pt was c/o headache and gave him tylenol 650mg at bedtime.

## 2024-07-31 NOTE — PLAN OF CARE
BEH IP Unit Acuity Rating Score (UARS)  Patient is given one point for every criteria they meet.    CRITERIA SCORING   On a 72 hour hold, court hold, committed, stay of commitment, or revocation. 0    Patient LOS on BEH unit exceeds 20 days. 0  LOS: 15   Patient under guardianship, 55+, otherwise medically complex, or under age 11. 0   Suicide ideation without relief of precipitating factors. 0   Current plan for suicide. 0   Current plan for homicide. 0   Imminent risk or actual attempt to seriously harm another without relief of factors precipitating the attempt. 0   Severe dysfunction in daily living (ex: complete neglect for self care, extreme disruption in vegetative function, extreme deterioration in social interactions). 1   Recent (last 7 days) or current physical aggression in the ED or on unit. 0   Restraints or seclusion episode in past 72 hours. 0   Recent (last 7 days) or current verbal aggression, agitation, yelling, etc., while in the ED or unit. 0   Active psychosis. 1   Need for constant or near constant redirection (from leaving, from others, etc).  0   Intrusive or disruptive behaviors. 0   Patient requires 3 or more hours of individualized nursing care per 8-hour shift (i.e. for ADLs, meds, therapeutic interventions). 0   TOTAL 2

## 2024-07-31 NOTE — PROGRESS NOTES
"PSYCHIATRY  PROGRESS NOTE     DATE OF SERVICE   07/31/2024  [facility], [place]   Psychiatric Progress Note  Hospital Day: 22       CHIEF COMPLAINT   \"The voices are a little better.\"       SUBJECTIVE    reports: Accepted at [facility]. Pending discharge 08/02/24.     HPI/Reason for admission: Per DEC assessment completed on 7/9/2024:   \" Upon assessment, pt speaks barely above a whisper and requires several prompts in order for this writer to clearly understand him. He tells this writer that he has been extensively researching \"angelology\" and talks at length about various Jain references, including seraphims and cherubims. He endorses auditory and visual hallucinations. When asked about their content, pt tells this writer that the voices are talking to him about the Bible \"all the time\" as well as about \"[place]\" and \"apprenticeship\". He reports hearing \"[place] languages\" and says that he has been speaking to a ghost. He states that the voices are also sometimes negative in nature, such as telling him that his mother is being harmed. Pt tells this writer that he has also been experiencing \"visions\" of snakes, animals, crystals, and eggs. He has seen himself be eaten by a shark as well as someone stab him. Pt lives with his mother and exhibits significant paranoia toward their roommate. He believes that their roommate has been using her \"eyes for witchcraft\" to terrify his mother. He also thinks that she has potentially been coughing to hypnotize his mother. Pt believes that their roommate is trying to trick him and has been looking at him \"like a robot\". Pt notes that he did recently threaten their roommate as he was concerned for his mother's safety. He reports having a pending terroristic threat charge related to this with court on 7/24. He denies any HI toward his roommate or others at present. When asked about SI, pt reports thinking about going to heaven but " "denies a plan or intent for suicide. Pt tells this writer that he has been sleeping and eating very minimally. When asked about substance use, he reports recently drinking a Four Loco and using psilocybin mushrooms earlier this year. He states that he is not using psilocybin mushrooms at present but did \"take a lot\" earlier this year. He is worried that he may have caused harm to himself from his psilocybin use. Pt tells this writer that he has \"no clue what is going on\" at present but that he is \"sane\" and that his only goal is to protect his mother and go back to work to provide for her. He notes that his family has been having difficulty affording rent and food lately.\"       OBJECTIVE   Patient had phone interview with [facility] and has been accepted with intake scheduled August 2, 2024.  Will order 30-day supply of medications.  Patient denies side effects from currently prescribed medications.  Seroquel at at bedtime as needed was discontinued.  Melatonin 10 mg nightly added as well as increase in Benadryl to 50 mg nightly as needed.  Patient describes ongoing auditory hallucinations however states that they are not command in nature.  Patient states that he would not follow through with commands if they were present and he would let staff know.  Patient feels that overall frequency and intensity of hallucinations have decreased since admission.  Patient denies symptoms of depression and/or anxiety.  Patient calm and pleasant during assessment.  Patient stated that he is in agreement with plan to discharge to [facility].  Patient denies suicidal/homicidal ideation/intent/plan.  Denies pain/physical complaints.     MEDICATIONS   Medications:  Scheduled Meds:  Current Facility-Administered Medications   Medication Dose Route Frequency Provider Last Rate Last Admin    OLANZapine (zyPREXA) tablet 20 mg  20 mg Oral At Bedtime Adolfo Soler, PHI CNP   20 mg at 07/30/24 2000    paliperidone ER " (INVEGA) 24 hr tablet 9 mg  9 mg Oral Daily Adolfo Soler APRN CNP   9 mg at 07/31/24 0809    polyethylene glycol (MIRALAX) Packet 17 g  17 g Oral Daily Sarah Kim APRN CNP   17 g at 07/31/24 0809     Continuous Infusions:  Current Facility-Administered Medications   Medication Dose Route Frequency Provider Last Rate Last Admin     PRN Meds:.  Current Facility-Administered Medications   Medication Dose Route Frequency Provider Last Rate Last Admin    acetaminophen (TYLENOL) tablet 650 mg  650 mg Oral Q4H PRN Adolfo Soler APRN CNP   650 mg at 07/28/24 0846    Or    acetaminophen (TYLENOL) Suppository 650 mg  650 mg Rectal Q4H PRN Adolfo Soler APRN CNP        benztropine (COGENTIN) tablet 0.5 mg  0.5 mg Oral BID PRN Adolfo Soler APRN CNP        diphenhydrAMINE (BENADRYL) capsule 25 mg  25 mg Oral At Bedtime PRN Chandana Grady MD        hydrOXYzine HCl (ATARAX) tablet 25 mg  25 mg Oral Q4H PRN Chandana Grady MD   25 mg at 07/28/24 0849    ibuprofen (ADVIL/MOTRIN) tablet 200-400 mg  200-400 mg Oral Q6H PRN Tova Mendez APRN CNP   400 mg at 07/29/24 0832    nicotine (NICORETTE) gum 2-4 mg  2-4 mg Buccal Q1H PRN Jose Boucher MD   2 mg at 07/29/24 1259    OLANZapine (zyPREXA) injection 10 mg  10 mg Intramuscular Daily PRN Chyna Galindo APRN CNP        OLANZapine zydis (zyPREXA) ODT tab 10 mg  10 mg Oral Daily PRN Chyna Galindo APRN CNP        QUEtiapine (SEROquel) tablet 25 mg  25 mg Oral At Bedtime PRN Chyna Galindo APRN CNP   25 mg at 07/30/24 2102    sennosides (SENOKOT) tablet 8.6 mg  8.6 mg Oral BID PRN Adolfo Soler APRN CNP           Medication adherence issues: MS Med Adherence Y/N: No  Medication side effects: MEDICATION SIDE EFFECTS: no side effects reported  Benefit: Yes / No: Yes       ROS   Pertinent items are noted in HPI.       MENTAL STATUS EXAM   Vitals: /79 (BP Location: Right arm, Patient Position: Sitting, Cuff Size:  "Adult Regular)   Pulse 95   Temp 97.8  F (36.6  C) (Oral)   Resp 18   Wt 71.4 kg (157 lb 4.8 oz)   SpO2 99%     Appearance:  Disheveled  Mood:  {Mood: \"good\"  Affect: full range  was congruent to speech  Suicidal Ideation: PRESENT / ABSENT: absent   Homicidal Ideation: PRESENT / ABSENT: absent   Thought process: disorganized   Thought content: significant for  Auditory hallucinations .   Fund of Knowledge: Average  Attention/Concentration: Fair  Language ability:  Intact  Memory:  Immediate recall intact  Insight:  fair.  Judgement: fair  Orientation: Yes, x4  Psychomotor Behavior: normal or unremarkable    Muscle Strength and Tone: MuscleStrength: Normal  Gait and Station: Normal    Minimal change since 07/30/24.      LABS   personally reviewed.     No results found for: \"PHENYTOIN\", \"PHENOBARB\", \"VALPROATE\", \"CBMZ\"       DIAGNOSIS   Principal Problem:    Psychosis NOS    Active Problem List:  Patient Active Problem List   Diagnosis    Bipolar disorder, unspecified (H)    Psychosis (H)    Substance abuse (H)    Acute psychosis (H)          PLAN   Plan as per previously attending psychiatrist, Dr. Miguel A MD, dated 7/29/24:     1) Continue Zyprexa and Invega.   2) Add Benadryl 25mg at bedtime PRN insomnia.   3) Patient likely to IRTS this week.      Disposition Plan  Reason for ongoing admission: is unable to care for self due to severe psychosis or katja  Discharge location: IRTS facility  Discharge Medications: not ordered  Follow-up Appointments: not scheduled  Legal Status: voluntary    Psychiatric coverage provided by PHI Akbar CNP beginning on 07/30/24.    07/30/24: Decrease Seroquel to 25 mg q HS PRN  07/31/24: Discontinue Seroquel                   Melatonin 10 mg q HS                  Increase benadryl to 50 mg q HS PRN for sleep       Risk Assessment: IP MHAC RISK ASSESSMENT: Patient on precautions    Coordination of Care:   Treatment Plan reviewed and physician signed, Care discussed " with Care/Treatment Team Members, Chart reviewed and Patient seen      Re-Certification I certify that the inpatient psychiatric facility services furnished since the previous certification were, and continue to be, medically necessary for, either, treatment which could reasonably be expected to improve the patient s condition or diagnostic study and that the hospital records indicate that the services furnished were, either, intensive treatment services, admission and related services necessary for diagnostic study, or equivalent services.     I certify that the patient continues to need, on a daily basis, active treatment furnished directly by or requiring the supervision of inpatient psychiatric facility personnel.   I estimate 1-2 days of hospitalization is necessary for proper treatment of the patient. My plans for post-hospital care for this patient are  IR facility     PHI Akbar CNP    -     07/31/2024  -     8:29 AM    Total time  35 minutes with > 50%spent on coordination of cares and psycho-education.    This note was created with help of Dragon dictation system. Grammatical / typing errors are not intentional.    PHI Akbar CNP

## 2024-07-31 NOTE — PROGRESS NOTES
Rehab Group    Start time: 2030  End time: 2115  Patient time total: 20 minutes    attended partial group    #2 attended   Group Type: music   Group Topic Covered: balanced lifestyle, relaxation , self-care, self-esteem, and sensory intervention   Group Session Detail: Cooperatively engaged in Evening Music Relaxation group to decrease anxiety and promote sleep.     Patient Response/Contribution:  actively engaged   Patient Detail: Calm affect, appropriately engaged in session, responding well to the music.       Activity Therapy Per 15 minutes () Other Rehab therapies    Patient Active Problem List   Diagnosis    Bipolar disorder, unspecified (H)    Psychosis (H)    Substance abuse (H)    Acute psychosis (H)

## 2024-07-31 NOTE — DISCHARGE INSTRUCTIONS
Behavioral Discharge Planning and Instructions    Summary: You were admitted on 7/9/2024  due to Psychotic Disorder NOS.  You were treated by PHI Martinez,LIZZIE, Debra Naegele, APRN,LIZZIE, and PHI Lees CNP and discharged on 8/5/24 from [facility]    Main Diagnosis:   Schizoaffective disorder, bipolar type     Health Care Follow-up:     Psychiatry appointment: Wednesday, August 14, 2024 @ 4:15pm In-person   Provider: PHI Mullins  Location: [place] [address]  Phone: [phone]  Fax: [phone]  Notes: Paperwork to be completed at least 48 hours prior to your appointment will be sent via email ([email or web address]).   HUC TO FAX AVS to above appointment, please    Mental Health Case Management:   A referral was attempted to be made to [facility] for Mental Health Case Management while you were at the hospital, however they are not able to take the referral at this time since you will be going to an IRTS placement. They requested that at day 45 at the IRTS placement you call into their intake screener to complete the screening for case management services. Please work with [facility] staff to assist you with this. Contact for the screener is listed below:   Wandy Hernandez Ph: [phone] Fax: [phone]      Information will be faxed to your outpatient providers to ensure a healthy continuity of care for you.     Attend all scheduled appointments with your outpatient providers. Call at least 24 hours in advance if you need to reschedule an appointment to ensure continued access to your outpatient providers.     Major Treatments, Procedures and Findings:  You were provided with: a psychiatric assessment, assessed for medical stability, medication evaluation and/or management, group therapy, and milieu management    Symptoms to Report: feeling more aggressive, increased confusion, losing more sleep, mood  getting worse, or thoughts of suicide    Early warning signs can include: increased depression or anxiety sleep disturbances increased thoughts or behaviors of suicide or self-harm  increased unusual thinking, such as paranoia or hearing voices    Safety and Wellness:  Take all medicines as directed.  Make no changes unless your doctor suggests them.      Follow treatment recommendations.  Refrain from alcohol and non-prescribed drugs.  Ask your support system to help you reduce your access to items that could harm yourself or others. If there is a concern for safety, call 911.    Resources:   Mental Health Crisis Resources  Throughout [place]: call **CRISIS (**604640)  Crisis Text Line: is available for free, 24/7 by texting [place] to 252609  Suicide Awareness Voices of Education (SAVE) (www.save.org): 576-471-SEZU (8313)  The National Suicide Prevention Lifeline is now: 988 Suicide and Crisis Lifeline. Call 988 anytime.  National [place] on Mental Illness (www.[place].jimmy.org): 180.673.5269 or 933-220-7096.  Edoi6jqoi: text the word LIFE to 46181 for immediate support and crisis intervention  Mental Health Consumer/Survivor Network of [place] (www.mhcsn.net): 172.178.8501 or 509-052-3125  Mental Health Association of [place] (www.mentalhealth.org): 770.247.1245 or 350-430-6848  Peer Support Connection [place] Warmline (PSC) 1-651.707.6463 Available from 5pm - 9am (7 days a week/365 days a year)  [facility] 1-916.586.1515 [facility] Crisis Response Services      General Medication Instructions:   See your medication sheet(s) for instructions.   Take all medicines as directed.  Make no changes unless your doctor suggests them.   Go to all your doctor visits.  Be sure to have all your required lab tests. This way, your medicines can be refilled on time.  Do not use any drugs not prescribed by your doctor.  Avoid alcohol.    Advance Directives:   Scanned document on file with [employer]? No scanned doc  Is document scanned? Pt states no  documents  Honoring Choices Your Rights Handout: Informed and given  Was more information offered? Pt declined    The Treatment team has appreciated the opportunity to work with you. If you have any questions or concerns about your recent admission, you can contact the unit which can receive your call 24 hours a day, 7 days a week. They will be able to get in touch with a Provider if needed. The unit number is [phone] .

## 2024-07-31 NOTE — PROGRESS NOTES
Prior Authorization **INITIATED**    Medication: PALIPERIDONE ER 9 MG PO TB24  Insurance Company: [place] Medicaid ([facility]) - Phone [phone] Fax [phone]  Pharmacy Filling the Rx: [place], [place] - [address]  Filling Pharmacy Phone: [phone]  Filling Pharmacy Fax: [phone]  Start Date: 7/31/2024  Reference #: CoverMyMeds Key: [id number]  Comments:  -      Kathi Quiles CPhT  Discharge Pharmacy Liaison  [facility] Discharge Pharm[facility]  Pronouns: She/Her/Hers    Securely message with [employer], Epic Secure Chat, or [employer]  Phone: [phone]  Fax: [phone]  [email or web address]

## 2024-07-31 NOTE — PLAN OF CARE
Problem: Anxiety Signs/Symptoms  Goal: Optimized Energy Level (Anxiety Signs/Symptoms)  Outcome: Progressing   Goal Outcome Evaluation:  Pt has presented as bright and pleasant thru the day. He eats well, takes meds and vitals WDL  He asks about discharge but informed him this will be discussed.  Will continue to assess.

## 2024-08-01 PROBLEM — F25.0 SCHIZOAFFECTIVE DISORDER, BIPOLAR TYPE (H): Status: ACTIVE | Noted: 2024-07-09

## 2024-08-01 PROCEDURE — 250N000013 HC RX MED GY IP 250 OP 250 PS 637: Performed by: CLINICAL NURSE SPECIALIST

## 2024-08-01 PROCEDURE — 128N000002 HC R&B CD/MH ADOLESCENT

## 2024-08-01 PROCEDURE — 99233 SBSQ HOSP IP/OBS HIGH 50: CPT | Performed by: NURSE PRACTITIONER

## 2024-08-01 PROCEDURE — H2032 ACTIVITY THERAPY, PER 15 MIN: HCPCS | Performed by: MARRIAGE & FAMILY THERAPIST

## 2024-08-01 PROCEDURE — 250N000013 HC RX MED GY IP 250 OP 250 PS 637: Performed by: NURSE PRACTITIONER

## 2024-08-01 PROCEDURE — 250N000013 HC RX MED GY IP 250 OP 250 PS 637: Performed by: PSYCHIATRY & NEUROLOGY

## 2024-08-01 PROCEDURE — 97150 GROUP THERAPEUTIC PROCEDURES: CPT | Mod: GO

## 2024-08-01 PROCEDURE — 250N000013 HC RX MED GY IP 250 OP 250 PS 637: Performed by: REGISTERED NURSE

## 2024-08-01 RX ADMIN — NICOTINE POLACRILEX 4 MG: 2 GUM, CHEWING BUCCAL at 15:59

## 2024-08-01 RX ADMIN — POLYETHYLENE GLYCOL 3350 17 G: 17 POWDER, FOR SOLUTION ORAL at 08:11

## 2024-08-01 RX ADMIN — PALIPERIDONE 9 MG: 3 TABLET, EXTENDED RELEASE ORAL at 08:11

## 2024-08-01 RX ADMIN — Medication 10 MG: at 19:00

## 2024-08-01 RX ADMIN — OLANZAPINE 20 MG: 10 TABLET, FILM COATED ORAL at 19:00

## 2024-08-01 ASSESSMENT — ACTIVITIES OF DAILY LIVING (ADL)
ADLS_ACUITY_SCORE: 29

## 2024-08-01 NOTE — PLAN OF CARE
Rehab Group    Start time: 1415  End time: 1500  Patient time total: 30 minutes    attended partial group    #6 attended   Group Type: occupational therapy   Group Topic Covered: balanced lifestyle, coping skills, problem solving, and self-care       Group Session Detail:  Life Skills: Goal Setting     Patient Response/Contribution:  cooperative with task, expressed understanding of topic, and actively engaged       Patient Detail:    Mental health management group designed to promote insight, self-reflection, self-esteem and goal-setting. Pt Response: Pt was engaged and appeared able to follow and complete the 2-part activity independently, though he was pulled out of group at the end when patients were sharing their individual responses to various prompts. During initial discussion however, pt appeared comfortable sharing goals and ideas with group members, presenting with good self-esteem and confidence.         [id number] OT Group (2 or more in attendance)      Patient Active Problem List   Diagnosis    Schizoaffective disorder, bipolar type (H)    Psychosis (H)    Substance abuse (H)    Acute psychosis (H)

## 2024-08-01 NOTE — PLAN OF CARE
Met patient to complete safety plan. Patient reported he is bored and ready to discharge. He discharges tomorrow to IRTS.

## 2024-08-01 NOTE — PLAN OF CARE
"Problem: Depressive Symptoms  Goal: Depressive Symptoms  Description: Signs and symptoms of listed problems will be absent or manageable.  Outcome: Progressing     Problem: Psychotic Symptoms  Goal: Psychotic Symptoms  Description: Signs and symptoms of listed problems will be absent or manageable.  Outcome: Progressing   Goal Outcome Evaluation:  Pt was alert, calm and cooperative with staff.Pt took all of his evening medications. Pt reports good appetite, ate 100% of dinner and snacks.     Pt was observed pacing in the hallway and watching Tv with peers. Pt was withdrawn and self isolative.    Pt checked in as doing good, Pt denied pain, anxiety, depression, HI, SIB, and contracted for safety.    Pt endorses auditory hallucinations pt reported hearing voices telling him \"owl loves you\". Pt declined visual hallucinations.    No PRN requested and received during this shift.   No medication side effects and medical concerns reported.    "

## 2024-08-01 NOTE — PLAN OF CARE
Problem: Anxiety Signs/Symptoms  Goal: Optimized Energy Level (Anxiety Signs/Symptoms)  Outcome: Progressing   Goal Outcome Evaluation:         Pt was up to breakfast early, eats well.  He was compliant with meds.  Voices are minimal per pt.  ADL's are ok  Vitals WDL  Easily redirectable.  Will continue to assess.

## 2024-08-01 NOTE — PLAN OF CARE
Rehab Group    Start time: 1115  End time: 1205  Patient time total: 50 minutes    attended full group    #4 attended   Group Type: occupational therapy   Group Topic Covered: balanced lifestyle, cognitive activities, coping skills, healthy leisure time, problem solving, and social skills       Group Session Detail:  OT CLINIC     Patient Response/Contribution:  cooperative with task, organized, socially appropriate, and actively engaged       Patient Detail:    Occupational therapy clinic to facilitate coping skill exploration, creative expression within personally meaningful activities, and clinical observation of social, cognitive, and kinesthetic performance skills. Pt response: Pt presented with calm mood, pleasant affect. IND to initiate, gather materials, sequence, and adjust to workspace demands as needed for a creative expressive task. Pt demonstrated good focus, creativity, and task planning.       [id number] OT Group (2 or more in attendance)      Patient Active Problem List   Diagnosis    Schizoaffective disorder, bipolar type (H)    Psychosis (H)    Substance abuse (H)    Acute psychosis (H)

## 2024-08-01 NOTE — PLAN OF CARE
Team Note Due:  Tuesday    Assessment/Intervention/Current Symtoms and Care Coordination:  Chart review and met with team, discussed pt progress, symptomology, and response to treatment.  Discussed the discharge plan and any potential impediments to discharge.    Per treatment team, pt's invega was originally denied by insurance and a prior auth is being resubmitted to determine coverage. Pharmacy may not receive response on this until Friday morning.     [facility] made p.c to Steve with [facility]. [facility] reports that they can re-schedule pt for Monday admission. [facility] notified treatment team.     [facility] met with pt and provided update on discharge date. Pt was accepting of this information. Pt requested CTC call his mom to answer questions she has. Pt signed KVNG for mom and aunt.     CTC made p.c to pt's mom Lara. [facility] updated about discharge plans.     [facility] received request from care coordinators that need pt's email. [facility] met with pt. Pt needed access to phone to remember what his email address was. [facility] assisted pt with using phone to get email address. [facility] provided this to the care coordinators.     Discharge Plan or Goal:  Continue stabilization of mental health symptoms and establish a safe discharge plan.      Dispo Plan: [facility]  on 8/5/24  Transportation: Medical cab ride to be scheduled to  at 9am.   Provisional discharge required: No   AVS complete: No    Barriers to Discharge:  Medication management and discharge medications. Pharmacy submitting prior auth for Invega.     Referral Status:  [facility] sent referrals to IRTS placements on 7/24:    [facility] - 7/29 confirmed they received the referral- been assigned to [facility]. 7/31- Pt accepted to and going to [facility] on 8/5     Legal Status:  Voluntary      Contacts:  GRIS OGBA - Mom- Ph [phone]- KVNG signed- Pt's mom is also deaf. This phone number calls an  to connect to Светлана Barrett - Michelle   - [phone] x [phone]      Upcoming Meetings and Dates/Important Information and next steps:  CTC to ensure psychiatry appt and transportation is scheduled.   CTC to fax discharge summary to [employer] at [phone] upon discharge.

## 2024-08-01 NOTE — PROGRESS NOTES
Reconsideration Request **INITIATED**    We have initiated an appeal for the requested medication:  Medication: PALIPERIDONE ER 9 MG PO TB24  Appeal Start Date:  8/1/2024  Insurance Company: [place] Medicaid ([facility]) - Phone [phone] Fax [phone]  Comments:  -      Kathi Quiles CP  Discharge Pharmacy Liaison  [facility] Discharge Pharmacy  Pronouns: She/Her/Hers    Securely message with Vocera, Epic Secure Chat, or [employer]  Phone: [phone]  Fax: [phone]  [email or web address]

## 2024-08-01 NOTE — PROGRESS NOTES
Rehab Group    Start time: 1015  End time: 1115  Patient time total: 30 minutes    attended partial group    #4 attended   Group Type: art   Group Topic Covered: cognitive therapy       Group Session Detail:  Pts were encouraged to choose a positive affirmation from a handout of self compassion affirmations and to create art using the chose affirmation.      Patient Response/Contribution:  cooperative with task       Patient Detail:    Pt was a quiet, engaged participant, focused on task for the full time pt attended group. Pt needs a second AT group to finish. Pts mood was calm, pleasant participant.      Activity Therapy Per 15 min ()      Patient Active Problem List   Diagnosis    Schizoaffective disorder, bipolar type (H)    Psychosis (H)    Substance abuse (H)    Acute psychosis (H)

## 2024-08-01 NOTE — PROGRESS NOTES
Prior Authorization **DENIED**    Medication: PALIPERIDONE ER 9 MG PO TB24  Denial Date:    Reference #: CoverMyMeds Key: [id number], HID#: [id number]  Denial Rational:     Appeal Information:     Comments:  -        Kathi Quiles CP  Discharge Pharmacy Liaison  [facility] Discharge Pharmacy  Pronouns: She/Her/Hers    Securely message with [employer], Epic Secure Chat, or [employer]  Phone: [phone]  Fax: [phone]  [email or web address]

## 2024-08-01 NOTE — PROGRESS NOTES
"PSYCHIATRY  PROGRESS NOTE     DATE OF SERVICE   08/01/2024  [facility], [place]   Psychiatric Progress Note  Hospital Day: 23       CHIEF COMPLAINT   \"The voices are a little better.\"       SUBJECTIVE    reports: Accepted at [facility] Pending discharge 8/5/24.     HPI/Reason for admission: Per DEC assessment completed on 7/9/2024:   \" Upon assessment, pt speaks barely above a whisper and requires several prompts in order for this writer to clearly understand him. He tells this writer that he has been extensively researching \"angelology\" and talks at length about various Anglican references, including seraphims and cherubims. He endorses auditory and visual hallucinations. When asked about their content, pt tells this writer that the voices are talking to him about the Bible \"all the time\" as well as about \"[place]\" and \"apprenticeship\". He reports hearing \"[place] languages\" and says that he has been speaking to a ghost. He states that the voices are also sometimes negative in nature, such as telling him that his mother is being harmed. Pt tells this writer that he has also been experiencing \"visions\" of snakes, animals, crystals, and eggs. He has seen himself be eaten by a shark as well as someone stab him. Pt lives with his mother and exhibits significant paranoia toward their roommate. He believes that their roommate has been using her \"eyes for witchcraft\" to terrify his mother. He also thinks that she has potentially been coughing to hypnotize his mother. Pt believes that their roommate is trying to trick him and has been looking at him \"like a robot\". Pt notes that he did recently threaten their roommate as he was concerned for his mother's safety. He reports having a pending terroristic threat charge related to this with court on 7/24. He denies any HI toward his roommate or others at present. When asked about SI, pt reports thinking about going to heaven but " "denies a plan or intent for suicide. Pt tells this writer that he has been sleeping and eating very minimally. When asked about substance use, he reports recently drinking a Four Loco and using psilocybin mushrooms earlier this year. He states that he is not using psilocybin mushrooms at present but did \"take a lot\" earlier this year. He is worried that he may have caused harm to himself from his psilocybin use. Pt tells this writer that he has \"no clue what is going on\" at present but that he is \"sane\" and that his only goal is to protect his mother and go back to work to provide for her. He notes that his family has been having difficulty affording rent and food lately.\"       OBJECTIVE   Writer had long discussion with patient regarding specific details of mental health history and timeline for diagnostic clarification.  Patient reports traumatic childhood including long-term homelessness lack of access to education as well as \"bouncing around from home to home and in between [facility] in [facility].  Patient describes periods of depression as well as katja.  Patient described katja as lack of need for sleep, impulsivity as well as inflated sense of self worth.  Patient stated that when he trialed Zoloft he impulsively moved cities.  Patient also describes symptoms of psychosis that have been present for slightly over 2 years.  Patient acknowledges auditory hallucinations as well as paranoia that when unmedicated have led to legal charges involving threats of bodily harm and terroristic threats.  Patient is now currently stabilized on olanzapine and paliperidone.  Patient stated he would be agreeable to long-acting injectable if it were every 30 days rather than bimonthly.  Patient denies side effects of these currently prescribed medications and has demonstrated decreased symptoms of psychosis on reports decrease of intensity and frequency of auditory hallucinations.  Symptoms of paranoia significantly " diminished.  Patient denies symptoms of depression and anxiety currently.  Patient agreeable to discharge to [facility].  Patient able to participate in coherent and organized conversations and currently prescribed medications.  Patient has history of medication noncompliance and decompensation resulting in previously described symptoms.  Writer's opinion is that it is imperative for patient to maintain medication compliance for success in the community.  Primary diagnosis updated to schizoaffective disorder bipolar type. Patient denies suicidal/homicidal ideation/intent/plan.  Denies pain/physical complaints.     MEDICATIONS   Medications:  Scheduled Meds:  Current Facility-Administered Medications   Medication Dose Route Frequency Provider Last Rate Last Admin    melatonin tablet 10 mg  10 mg Oral At Bedtime Chyna Galindo APRN CNP   10 mg at 07/31/24 1939    OLANZapine (zyPREXA) tablet 20 mg  20 mg Oral At Bedtime Adolfo Soler APRN CNP   20 mg at 07/31/24 1900    paliperidone ER (INVEGA) 24 hr tablet 9 mg  9 mg Oral Daily Adolfo Soler APRN CNP   9 mg at 08/01/24 0811    polyethylene glycol (MIRALAX) Packet 17 g  17 g Oral Daily Sarah Kim APRN CNP   17 g at 08/01/24 0811     Continuous Infusions:  Current Facility-Administered Medications   Medication Dose Route Frequency Provider Last Rate Last Admin     PRN Meds:.  Current Facility-Administered Medications   Medication Dose Route Frequency Provider Last Rate Last Admin    acetaminophen (TYLENOL) tablet 650 mg  650 mg Oral Q4H PRN Adolfo Soler APRN CNP   650 mg at 07/31/24 1938    Or    acetaminophen (TYLENOL) Suppository 650 mg  650 mg Rectal Q4H PRN Adolfo Soler APRN CNP        benztropine (COGENTIN) tablet 0.5 mg  0.5 mg Oral BID PRN Adolfo Soler APRN CNP        diphenhydrAMINE (BENADRYL) capsule 50 mg  50 mg Oral At Bedtime PRN Chyna Galindo APRN CNP   50 mg at 07/31/24 1939    hydrOXYzine HCl (ATARAX)  "tablet 25 mg  25 mg Oral Q4H PRN Chadnana Grady MD   25 mg at 07/28/24 0849    ibuprofen (ADVIL/MOTRIN) tablet 200-400 mg  200-400 mg Oral Q6H PRN Tova Mendez APRN CNP   400 mg at 07/31/24 1410    nicotine (NICORETTE) gum 2-4 mg  2-4 mg Buccal Q1H PRN Jose Boucher MD   2 mg at 07/29/24 1259    OLANZapine (zyPREXA) injection 10 mg  10 mg Intramuscular Daily PRN Chyna Galindo APRN CNP        OLANZapine zydis (zyPREXA) ODT tab 10 mg  10 mg Oral Daily PRN Chyna Galindo APRN CNP        sennosides (SENOKOT) tablet 8.6 mg  8.6 mg Oral BID PRN Adolfo Soler APRN CNP           Medication adherence issues: MS Med Adherence Y/N: No  Medication side effects: MEDICATION SIDE EFFECTS: no side effects reported  Benefit: Yes / No: Yes       ROS   Pertinent items are noted in HPI.       MENTAL STATUS EXAM   Vitals: /73 (BP Location: Left arm, Patient Position: Sitting, Cuff Size: Adult Regular)   Pulse 98   Temp 97.9  F (36.6  C) (Oral)   Resp 16   Wt 71.4 kg (157 lb 4.8 oz)   SpO2 98%     Appearance: Adequate hygiene  Mood:  {Mood: \"good\"  Affect: full range  was congruent to speech  Suicidal Ideation: PRESENT / ABSENT: absent   Homicidal Ideation: PRESENT / ABSENT: absent   Thought process: Organized  Thought content: significant for  Auditory hallucinations , decreased frequency/intensity  Fund of Knowledge: Average  Attention/Concentration: Good  Language ability:  Intact  Memory:  Immediate recall intact, short-term memory intact  Insight: Good  Judgement: fair  Orientation: Yes, x4  Psychomotor Behavior: normal or unremarkable    Muscle Strength and Tone: MuscleStrength: Normal  Gait and Station: Normal       LABS   personally reviewed.     No results found for: \"PHENYTOIN\", \"PHENOBARB\", \"VALPROATE\", \"CBMZ\"       DIAGNOSIS   Principal Problem:    Schizoaffective disorder, bipolar type    Active Problem List:  Patient Active Problem List   Diagnosis    Bipolar disorder, " unspecified (H)    Psychosis (H)    Substance abuse (H)    Acute psychosis (H)          PLAN   Plan as per previously attending psychiatrist, Dr. Miguel A MD, dated 7/29/24:     1) Continue Zyprexa and Invega.   2) Add Benadryl 25mg at bedtime PRN insomnia.   3) Patient likely to IRTS this week.      Disposition Plan  Reason for ongoing admission: is unable to care for self due to severe psychosis or katja  Discharge location: IRTS facility  Discharge Medications: not ordered  Follow-up Appointments: not scheduled  Legal Status: voluntary    Psychiatric coverage provided by PHI Akbar CNP beginning on 07/30/24.    07/30/24: Decrease Seroquel to 25 mg q HS PRN  07/31/24: Discontinue Seroquel                   Melatonin 10 mg q HS                  Increase benadryl to 50 mg q HS PRN for sleep   08/01/24: Continue with current plan of care.                  Pending discharge to [facility].                    Risk Assessment: [facility] RISK ASSESSMENT: Patient on precautions    Coordination of Care:   Treatment Plan reviewed and physician signed, Care discussed with Care/Treatment Team Members, Chart reviewed and Patient seen      Re-Certification I certify that the inpatient psychiatric facility services furnished since the previous certification were, and continue to be, medically necessary for, either, treatment which could reasonably be expected to improve the patient s condition or diagnostic study and that the hospital records indicate that the services furnished were, either, intensive treatment services, admission and related services necessary for diagnostic study, or equivalent services.     I certify that the patient continues to need, on a daily basis, active treatment furnished directly by or requiring the supervision of inpatient psychiatric facility personnel.   I estimate 1-2 days of hospitalization is necessary for proper treatment of the patient. My plans for post-hospital care for  this patient are  IRTS facility     PHI Akbar CNP    -     08/01/2024  -     8:17 AM    Total time 60 minutes with > 50%spent on coordination of cares and psycho-education.    This note was created with help of Dragon dictation system. Grammatical / typing errors are not intentional.    PHI Akbar CNP

## 2024-08-02 PROCEDURE — 128N000002 HC R&B CD/MH ADOLESCENT

## 2024-08-02 PROCEDURE — 250N000013 HC RX MED GY IP 250 OP 250 PS 637: Performed by: REGISTERED NURSE

## 2024-08-02 PROCEDURE — 99232 SBSQ HOSP IP/OBS MODERATE 35: CPT | Performed by: CLINICAL NURSE SPECIALIST

## 2024-08-02 PROCEDURE — 250N000013 HC RX MED GY IP 250 OP 250 PS 637: Performed by: CLINICAL NURSE SPECIALIST

## 2024-08-02 PROCEDURE — 97150 GROUP THERAPEUTIC PROCEDURES: CPT | Mod: GO

## 2024-08-02 PROCEDURE — 250N000013 HC RX MED GY IP 250 OP 250 PS 637: Performed by: NURSE PRACTITIONER

## 2024-08-02 RX ADMIN — PALIPERIDONE 9 MG: 3 TABLET, EXTENDED RELEASE ORAL at 08:43

## 2024-08-02 RX ADMIN — Medication 10 MG: at 19:04

## 2024-08-02 RX ADMIN — POLYETHYLENE GLYCOL 3350 17 G: 17 POWDER, FOR SOLUTION ORAL at 08:43

## 2024-08-02 RX ADMIN — OLANZAPINE 20 MG: 10 TABLET, FILM COATED ORAL at 19:05

## 2024-08-02 ASSESSMENT — ACTIVITIES OF DAILY LIVING (ADL)
ADLS_ACUITY_SCORE: 29
ORAL_HYGIENE: INDEPENDENT
ADLS_ACUITY_SCORE: 29
HYGIENE/GROOMING: INDEPENDENT
DRESS: SCRUBS (BEHAVIORAL HEALTH)
ADLS_ACUITY_SCORE: 29

## 2024-08-02 NOTE — CONSULTS
Consulted regarding prior auth for Invega PO.    Prior authorization has been approved for one year at $0 copay.      Please feel free to contact me with any other test claims, prior authorizations, or insurance questions regarding outpatient medications.     Thanks!      Kathi Quiles [facility]  Discharge Pharmacy Liaison  [facility]  Pronouns: She/Her/Hers    Securely message with [employer], Epic Secure Chat, or DrAvailable  Phone: [phone]  Fax: [phone]  [email or web address]

## 2024-08-02 NOTE — PROGRESS NOTES
"[facility], [place]   Psychiatric Progress Note        Interim History:   The patient's care was discussed with the treatment team during the daily team meeting and/or staff's chart notes were reviewed.  Staff report patient is visible in the milieu, he attends groups and is cooperative with his treatment.     Psychiatric symptoms  and interventions:     Patient presented in a calm manner. He was in group this morning. Patient reports he is feeling \"good\".  He is organized. He was able to follow out conversation. Patient denies auditory/visual hallucinations. Patient does not endorse safety concerns such as suicidal  or homicidal thinking.     Provider discussed medications with patient. Provider told him PA was obtained for Invega. Patient denies any side effects.     Sleep and appetite eyad adequate.     No behavior concerns.          Medications:     Current Facility-Administered Medications   Medication Dose Route Frequency Provider Last Rate Last Admin    melatonin tablet 10 mg  10 mg Oral At Bedtime Chyna Galindo APRN CNP   10 mg at 08/01/24 1900    OLANZapine (zyPREXA) tablet 20 mg  20 mg Oral At Bedtime Adolfo Soler APRN CNP   20 mg at 08/01/24 1900    paliperidone ER (INVEGA) 24 hr tablet 9 mg  9 mg Oral Daily Adolfo Soler APRN CNP   9 mg at 08/02/24 0843    polyethylene glycol (MIRALAX) Packet 17 g  17 g Oral Daily Sarah Kim APRN CNP   17 g at 08/02/24 0843          Allergies:   No Known Allergies       Labs:   No results found for this or any previous visit (from the past 24 hour(s)).       Psychiatric Examination:     /84 (BP Location: Right arm)   Pulse 102   Temp 97.8  F (36.6  C) (Temporal)   Resp 16   Wt 71.4 kg (157 lb 4.8 oz)   SpO2 98%   Weight is 157 lbs 4.8 oz  There is no height or weight on file to calculate BMI.  Orthostatic Vitals       None              Appearance: Adequate hygiene  Mood:   \"good\"  Affect: full range  was " congruent to speech  Suicidal Ideation: PRESENT / ABSENT: absent   Homicidal Ideation: PRESENT / ABSENT: absent   Thought process: Organized  Thought content: significant for  Auditory hallucinations , decreased frequency/intensity  Fund of Knowledge: Average  Attention/Concentration: Good  Language ability:  Intact  Memory:  Immediate recall intact, short-term memory intact  Insight: Good  Judgement: fair  Orientation: Yes, x4  Psychomotor Behavior: normal or unremarkable    Muscle Strength and Tone: MuscleStrength: Normal  Gait and Station: Normal    Clinical Global Impressions  First:     Most recent:            Precautions:     Behavioral Orders   Procedures    Assault precautions    Code 1 - Restrict to Unit    Elopement precautions    [facility] Extended Care     Until discharge, Extended Care to offer psychotherapeutic services to mental health patients boarding for admission or stabilization. These services are to include but are not limited to: individual psychotherapy, diagnostic assessment, case management and care planning, safety planning, etc. This may include up to 1 visit per day. If patient is physically located at [facility] or [facility], group psychotherapy up to 2 time per day may be offered.     Routine Programming     As clinically indicated    Sexual precautions     Pt came out of shower and exposed himself to staff.Approach in pairs    Status 15     Every 15 minutes.          DIagnoses:   Schizoaffective disorder, bipolar type          Plan:   Legal status: Voluntary     Medication management:   Zyprexa 20 mg HS to address mood instability and psychosis  Paliperidone ER 9 mg daily to address mood instability and psychosis   Miralax 17 g daily to address constipation   Melatonin 10 mg to address insomnia.      Medical: UTOX negative  Admission labs: unremarkable          Behavioral/psychology/social:   Encouraged patient to attend therapeutic hospital programming as tolerated   Precautions: Assault, sexual      Disposition:   Reason for continued hospitalization: Patient continues to need, on a daily basis, active treatment furnished directly by or requiring the supervision of inpatient psychiatric facility personnel.    Stabilization with medications, provide structured and supportive environment  Provide consulted with CTC regarding discharge planning.    Discharge meds:  ordered   Discharge plan: IRTs ([facility]) on Monday

## 2024-08-02 NOTE — PLAN OF CARE
BEH IP Unit Acuity Rating Score (UARS)  Patient is given one point for every criteria they meet.    CRITERIA SCORING   On a 72 hour hold, court hold, committed, stay of commitment, or revocation. 0    Patient LOS on BEH unit exceeds 20 days. 0  LOS: 17   Patient under guardianship, 55+, otherwise medically complex, or under age 11. 0   Suicide ideation without relief of precipitating factors. 0   Current plan for suicide. 0   Current plan for homicide. 0   Imminent risk or actual attempt to seriously harm another without relief of factors precipitating the attempt. 0   Severe dysfunction in daily living (ex: complete neglect for self care, extreme disruption in vegetative function, extreme deterioration in social interactions). 1   Recent (last 7 days) or current physical aggression in the ED or on unit. 0   Restraints or seclusion episode in past 72 hours. 0   Recent (last 7 days) or current verbal aggression, agitation, yelling, etc., while in the ED or unit. 0   Active psychosis. 1   Need for constant or near constant redirection (from leaving, from others, etc).  0   Intrusive or disruptive behaviors. 0   Patient requires 3 or more hours of individualized nursing care per 8-hour shift (i.e. for ADLs, meds, therapeutic interventions). 0   TOTAL 2

## 2024-08-02 NOTE — PLAN OF CARE
Rehab Group    Start time: 1015  End time: 1200  Patient time total: 75 minutes    attended full group    #4 attended   Group Type: occupational therapy   Group Topic Covered: cognitive activities, coping skills, healthy leisure time, and problem solving   Group Session Detail: OT: Education on healthy activity engagement and creative hands-on endeavor (OT clinic) to increase concentration, focus, attention to task/detail, decision making, problem solving, frustration tolerance, task follow through, coping with stress, healthy leisure engagement, creative expression, and social engagement    Patient Response/Contribution:  cooperative with task and actively engaged, pleasant and engaged on approach   Patient Detail: Pt sat with therapist and attended to familiar hands on creative endeavor. Pt engaged in pleasant reciprocal social interactions with therapist. Pt worked to complete two projects in preparation for his discharge to an [facility] on Monday. Pt talked at length about moving a lot a chid and he hopes to return to [facility] where he grew up. Pt reported he is looking forward to his discharge location. Pt engaged socially with peers and completed his project in a neat and tidy manner. Pt cleaned-up all supplies and his workspace. Pt in group room for 75 minutes but only 30 minutes are billable due to pt being only attendee for the first 45 minutes.       [id number] OT Group (2 or more in attendance)      Patient Active Problem List   Diagnosis    Schizoaffective disorder, bipolar type (H)    Psychosis (H)    Substance abuse (H)    Acute psychosis (H)

## 2024-08-02 NOTE — PLAN OF CARE
Care Coordination Note    Tasks Completed:     Care Coordinator scheduled the following Med Ride:     Insurance/ Name: CARLY ([phone])/ Alise (sent to Dispatch)/Amanda (provided cab info)   Transportation Company/ Name: [employer]  Ride Confirmation # [id number]  Transportation Company Phone: [phone]   P/u Date/Time: Monday, August 5, 2024 @ 9am   P/u Address: [facility], [address] [place], [place] [address] [id number]  Destination: [facility], [address] [place] [address]   **Cab instructed to call Nursing Station upon arrival: [phone]    CC was unable to reach a rep with [employer] to confirm ride date/time pick-up despite several attempts, receiving voicemail only.       CC scheduled the following appointment:     Psychiatry appointment: Wednesday, August 14, 2024 @ 4:15pm In-person   Provider: PHI Mullins  Location: [facility], [address] [place] [address]  Phone: [phone]  Fax: [phone]  Notes: Paperwork to be completed at least 48 hours prior to your appointment will be sent via email ([email or web address]).   HUC TO FAX AVS to above appointment, please    CC submitted a referral to [facility] both via fax ([phone]) and via secure encrypted PHI email ([email or web address]) for the purpose of scheduling and appointment follow-up.     CC updated CTC and AVS

## 2024-08-02 NOTE — PLAN OF CARE
Goal Outcome Evaluation:    Problem: Adult Inpatient Plan of Care  Goal: Absence of Hospital-Acquired Illness or Injury  Outcome: Adequate for Care Transition  Intervention: Prevent Skin Injury  Recent Flowsheet Documentation  Taken 8/2/2024 0858 by Griffin Gutierrez RN  Body Position: position changed independently      Plan of Care Reviewed With: patient       Pt med compliant, claims no pain. Pt endorsed depression 8/10 driven by desire to leave hospital. He denied anxiety, SI, HI, and hallucinations. Pt ate breakfast around 9:00, and went back to sleep. Pt attended morning OT groups, ate lunch, and went back to bed. Pt laid in bed all afternoon. Pt has a discharge order, medication for discharge are  in med room safe. Pt is expected to discharge on Monday.

## 2024-08-02 NOTE — PLAN OF CARE
"  Problem: Anxiety Signs/Symptoms  Goal: Optimized Cognitive Function (Anxiety Signs/Symptoms)  Outcome: Progressing   Goal Outcome Evaluation:         Received pt sleeping at the start of the shift. Pt came out and ate dinner in the lounge, good appetite ate 100%. Pt stated his sleep has been \"on and off.\" When asked about hallucinations pt stated \"not really.\" Pt denies all other mental health symptoms. Pt spent time watching TV in the lounge, somewhat withdrawn to self. No behaviors or safety concerns. Will continue to monitor.               "

## 2024-08-02 NOTE — PLAN OF CARE
Problem: Anxiety Signs/Symptoms  Goal: Improved Sleep (Anxiety Signs/Symptoms)  Intervention: Promote Healthy Sleep Hygiene  Recent Flowsheet Documentation  Taken 8/2/2024 0600 by Anmol Cummings RN  Sleep Hygiene Promotion: awakenings minimized  Problem: Sleep Disturbance  Goal: Adequate Sleep/Rest  Outcome: Progressing  Focus: Shift summary    Data: Patient slept 7 hours last night. No interventions needed. Respirations even and unlabored on status 15 checks. Will continue to monitor and report to oncoming staff.    Response: Report sleep hours to day shift. Continue to monitor patient and provide therapeutic interventions as necessary.

## 2024-08-03 PROCEDURE — 250N000013 HC RX MED GY IP 250 OP 250 PS 637: Performed by: NURSE PRACTITIONER

## 2024-08-03 PROCEDURE — 250N000013 HC RX MED GY IP 250 OP 250 PS 637: Performed by: REGISTERED NURSE

## 2024-08-03 PROCEDURE — 128N000002 HC R&B CD/MH ADOLESCENT

## 2024-08-03 RX ADMIN — PALIPERIDONE 9 MG: 3 TABLET, EXTENDED RELEASE ORAL at 08:10

## 2024-08-03 RX ADMIN — Medication 10 MG: at 19:00

## 2024-08-03 RX ADMIN — OLANZAPINE 20 MG: 10 TABLET, FILM COATED ORAL at 19:00

## 2024-08-03 ASSESSMENT — ACTIVITIES OF DAILY LIVING (ADL)
ADLS_ACUITY_SCORE: 29
HYGIENE/GROOMING: INDEPENDENT
ADLS_ACUITY_SCORE: 29
ORAL_HYGIENE: INDEPENDENT
ADLS_ACUITY_SCORE: 29
DRESS: SCRUBS (BEHAVIORAL HEALTH)
ADLS_ACUITY_SCORE: 29
ADLS_ACUITY_SCORE: 29

## 2024-08-03 NOTE — PLAN OF CARE
Problem: Adult Inpatient Plan of Care  Goal: Optimal Comfort and Wellbeing  Outcome: Progressing     Problem: Anxiety Signs/Symptoms  Goal: Optimized Energy Level (Anxiety Signs/Symptoms)  Intervention: Optimize Energy Level  Recent Flowsheet Documentation  Taken 8/3/2024 0900 by Nelson Anderson RN  Activity (Behavioral Health): up ad tio   Goal Outcome Evaluation:    Patient had flat affect. Mood was calm. Was isolative to room and had no socialization with peers. Denied pain, anxiety, depression, covid 19 symptoms, SI/HI/SIB/AH/VH, and contracted for safety. Patient declined to take Miralax but took other scheduled medication. Patient said he have bowel movement. Had good appetite. Will continue to monitor patient.

## 2024-08-04 VITALS
SYSTOLIC BLOOD PRESSURE: 121 MMHG | DIASTOLIC BLOOD PRESSURE: 79 MMHG | TEMPERATURE: 97.9 F | WEIGHT: 159.2 LBS | RESPIRATION RATE: 16 BRPM | OXYGEN SATURATION: 97 % | HEART RATE: 89 BPM

## 2024-08-04 PROCEDURE — H2032 ACTIVITY THERAPY, PER 15 MIN: HCPCS | Performed by: PSYCHOLOGIST

## 2024-08-04 PROCEDURE — 250N000013 HC RX MED GY IP 250 OP 250 PS 637: Performed by: NURSE PRACTITIONER

## 2024-08-04 PROCEDURE — 250N000013 HC RX MED GY IP 250 OP 250 PS 637: Performed by: REGISTERED NURSE

## 2024-08-04 PROCEDURE — 128N000002 HC R&B CD/MH ADOLESCENT

## 2024-08-04 RX ADMIN — PALIPERIDONE 9 MG: 3 TABLET, EXTENDED RELEASE ORAL at 08:36

## 2024-08-04 RX ADMIN — Medication 10 MG: at 19:09

## 2024-08-04 RX ADMIN — OLANZAPINE 20 MG: 10 TABLET, FILM COATED ORAL at 19:09

## 2024-08-04 RX ADMIN — ACETAMINOPHEN 650 MG: 325 TABLET, FILM COATED ORAL at 09:28

## 2024-08-04 ASSESSMENT — ACTIVITIES OF DAILY LIVING (ADL)
ADLS_ACUITY_SCORE: 29
DRESS: SCRUBS (BEHAVIORAL HEALTH)
ADLS_ACUITY_SCORE: 29
ORAL_HYGIENE: INDEPENDENT
ADLS_ACUITY_SCORE: 29
ADLS_ACUITY_SCORE: 29
HYGIENE/GROOMING: INDEPENDENT
ADLS_ACUITY_SCORE: 29

## 2024-08-04 NOTE — PROGRESS NOTES
"  Rehab Group    Start time: 1115  End time: 1215  Patient time total: 60 minutes    attended full group     #3 attended   Group Type: art   Group Topic Covered: mindfulness       Group Session Detail:  Art Therapy directive was to create art in response to a chosen mindfulness-based watercolor painting prompt.  Goals of directive:  Emotional expression, emotional regulation, mindfulness, media exploration     Patient Response/Contribution:  cooperative with task       Patient Detail:    Pt was an engaged participant, focused on task for the full duration of group. Pt finished painting and briefly verbally processed with author and group. Pt created a landscape in response to the prompt of: \"growth.\"  Pts mood was calm, pleasant participant.      Activity Therapy Per 15 min ()      Patient Active Problem List   Diagnosis    Schizoaffective disorder, bipolar type (H)    Psychosis (H)    Substance abuse (H)    Acute psychosis (H)      "

## 2024-08-04 NOTE — PROGRESS NOTES
Reconsideration Request **APPROVED**    Medication: PALIPERIDONE ER 9 MG PO TB24  Authorization Effective Date: 7/31/2024  Authorization Expiration Date: 7/25/2025  Approved Dose/Quantity: 1 tablet daily  Reference #: CoverMyMeds Key: [id number], HID#: [id number], PA #: [id number] via NPI [id number], NDC: [address]-16   Insurance Company: [place] Medicaid ([facility]) - Phone [phone] Fax [phone]  Expected CoPay:       CoPay Card Available: No    Foundation Assistance Needed: -  Which Pharmacy is filling the prescription (Not needed for infusion/clinic administered): [facility] - [place], [place] - [address]  Comments:  -    Note: This Prior Authorization is pharmacy-specific and NDC-specific. Current PA is approved through [facility]. If dispensing pharmacy or NDC changes from this initial approval, dispensing pharmacy can call [facility] at [phone] to update this information.        Kathi Quiles CPhT  Discharge Pharmacy Liaison  [facility]  Pronouns: She/Her/Hers    Securely message with GeoMe, Epic Secure Chat, or Sqwiggle  Phone: [phone]  Fax: [phone]  [email or web address]

## 2024-08-04 NOTE — PLAN OF CARE
Problem: Anxiety Signs/Symptoms  Goal: Optimized Cognitive Function (Anxiety Signs/Symptoms)  Outcome: Progressing   Goal Outcome Evaluation:   nursing assessment:  Pt evaluation continues. Assessed mood, anxiety, thoughts, and behavior are appropriate thru the weekend. Is progressing towards goals of possible discharge on Monday. Encourage participation in groups and developing healthy coping skills. Pt denies auditory or visual  hallucinations at this moment. Refer to daily team meeting notes for individualized plan of care. Will continue to assess.

## 2024-08-04 NOTE — PLAN OF CARE
Problem: Adult Inpatient Plan of Care  Goal: Optimal Comfort and Wellbeing  Outcome: Progressing     Problem: Adult Behavioral Health Plan of Care  Goal: Adheres to Safety Considerations for Self and Others  Outcome: Progressing   Goal Outcome Evaluation:       Patient had flat affect. Mood was calm. Was isolative to room and had no socialization with peers. Verbalized hearing voices but could not state what the voices are telling him. Complained of headache 6/10. Denied anxiety, depression, covid 19 symptoms, SI/HI/SIB/VH, and contracted for safety. Was given Tylenol 650 mg and scheduled medications. Intervention was helpful. Had good appetite. Will continue to monitor and assist patient.

## 2024-08-04 NOTE — PLAN OF CARE
"  Problem: Depressive Symptoms  Goal: Depressive Symptoms  Description: Signs and symptoms of listed problems will be absent or manageable.  Outcome: Progressing   Goal Outcome Evaluation:  Pt has presented as pleasant thru the evening. He denies any SI but endorses \"voices\" but says they are mild.  He eats well and drinks and ADL's are OK.  Vitals are WDL.  He likes to go to bed early.  Will continue to assess.                      "

## 2024-08-05 PROCEDURE — 250N000013 HC RX MED GY IP 250 OP 250 PS 637: Performed by: REGISTERED NURSE

## 2024-08-05 PROCEDURE — 99238 HOSP IP/OBS DSCHRG MGMT 30/<: CPT | Performed by: REGISTERED NURSE

## 2024-08-05 RX ADMIN — PALIPERIDONE 9 MG: 3 TABLET, EXTENDED RELEASE ORAL at 08:14

## 2024-08-05 ASSESSMENT — ACTIVITIES OF DAILY LIVING (ADL)
ADLS_ACUITY_SCORE: 29
HYGIENE/GROOMING: INDEPENDENT
ADLS_ACUITY_SCORE: 29
ADLS_ACUITY_SCORE: 29
DRESS: SCRUBS (BEHAVIORAL HEALTH)
ADLS_ACUITY_SCORE: 29
ORAL_HYGIENE: INDEPENDENT
ADLS_ACUITY_SCORE: 29

## 2024-08-05 NOTE — PROGRESS NOTES
Patient noted pleasant and cooperative with cares. Denied symptoms and contracted for safety. Was given scheduled medications. Had good appetite. Patient was discharged today. Discharge education given to patient and he verbalized good understanding. He left the unit with all his belongings.

## 2024-08-05 NOTE — PLAN OF CARE
Team Note Due:  Tuesday    Assessment/Intervention/Current Symtoms and Care Coordination:  Chart review and met with team, discussed pt progress, symptomology, and response to treatment.  Discussed the discharge plan and any potential impediments to discharge.    Pt's cab did not arrive. [facility] called transprortation company that was set up through insurance. No answer, only VM. [facility] ordered cab through hospital account as pt needs to be at the intake with [facility].    [facility] faxed AVS to [employer] at [phone].    Discharge Plan or Goal:  [facility] [place]  on 8[facility]  Transportation: Medical cab ride to be scheduled to  at 9am.   Provisional discharge required: No   AVS complete: Yes    Barriers to Discharge:  None    Referral Status:  [facility] sent referrals to IRTS placements on 7/24:    [employer] - 7/29 confirmed they received the referral- been assigned to [facility]. 7/31- Pt accepted to and going to [facility] location on 8/5     Legal Status:  Voluntary      Contacts:  GRIS FRIAS - Mom- Ph [phone]- KVNG signed- Pt's mom is also deaf. This phone number calls an  to connect to Светлана Barrett -  - [phone] x [phone]      Upcoming Meetings and Dates/Important Information and next steps:

## 2024-08-05 NOTE — DISCHARGE SUMMARY
"Psychiatric Discharge Summary    Pepe Stafford MRN# [id number]   Age: 24 year old YOB: 2000     Date of Admission:  7/9/2024  Date of Discharge:  8/5/2024  9:47 AM  Admitting Physician:  Chandana Grady MD  Discharge Physician:  Chandana Grady MD under direct care of Adolfo YIP (Contact: [phone])         Event Leading to Hospitalization:   Per ED note:  Pepe Stafford is a 24 year old male who presents homicidal and has hallucination. He has been seeing snakes, angles, crystal and eggs. He is also hearing voices. The patient mother states that he has been having these issues since May or June. He has not been eating or sleeping well since. It is noted that the roommate that his family lives with has been giving him paranoia. He thinks that the roommate is hypnotizing and hurting his mother.  The police were called on this issue. It is hard to follow what he is saying and he is having difficulties determining what is happening. He also notes that he thinks family and friends are acting weird. He is voluntary coming in because his mother advised him too. He is not having suicidal or homicidal thoughts.        Per DEC assessment completed on 7/9/2024:   \" Upon assessment, pt speaks barely above a whisper and requires several prompts in order for this writer to clearly understand him. He tells this writer that he has been extensively researching \"angelology\" and talks at length about various Taoist references, including seraphims and cherubims. He endorses auditory and visual hallucinations. When asked about their content, pt tells this writer that the voices are talking to him about the Bible \"all the time\" as well as about \"[place]\" and \"apprenticeship\". He reports hearing \"[place] languages\" and says that he has been speaking to a ghost. He states that the voices are also sometimes negative in nature, such as telling him that his mother is being harmed. Pt tells this writer that he " "has also been experiencing \"visions\" of snakes, animals, crystals, and eggs. He has seen himself be eaten by a shark as well as someone stab him. Pt lives with his mother and exhibits significant paranoia toward their roommate. He believes that their roommate has been using her \"eyes for witchcraft\" to terrify his mother. He also thinks that she has potentially been coughing to hypnotize his mother. Pt believes that their roommate is trying to trick him and has been looking at him \"like a robot\". Pt notes that he did recently threaten their roommate as he was concerned for his mother's safety. He reports having a pending terroristic threat charge related to this with court on 7/24. He denies any HI toward his roommate or others at present. When asked about SI, pt reports thinking about going to [facility] but denies a plan or intent for suicide. Pt tells this writer that he has been sleeping and eating very minimally. When asked about substance use, he reports recently drinking a Four Loco and using psilocybin mushrooms earlier this year. He states that he is not using psilocybin mushrooms at present but did \"take a lot\" earlier this year. He is worried that he may have caused harm to himself from his psilocybin use. Pt tells this writer that he has \"no clue what is going on\" at present but that he is \"sane\" and that his only goal is to protect his mother and go back to work to provide for her. He notes that his family has been having difficulty affording rent and food lately.\"       See Admission note by Chandana Grady MD on 7/16/24 for additional details.          DIagnoses:     Schizoaffective disorder, bipolar type     Active Problem List:      Patient Active Problem List   Diagnosis    Bipolar disorder, unspecified (H)    Psychosis (H)    Substance abuse (H)    Acute psychosis (H)             Labs:   No results found for this or any previous visit (from the past 24 hour(s)).         Consults:   No " consultations were requested during this admission         Hospital Course:   Pepe Stafford was admitted to Station 6A with attending Chandana Grady MD as a voluntary patient. The patient was placed under status 15 (15 minute checks) to ensure patient safety.   CBC, BMP and utox obtained.    The patient had not been taking any medications as an outpatient. Zyprexa was initiated and titrated up to 20 mg daily and paliperidone 9 mg daily.     Pepe Stafford did participate in groups and was visible in the milieu.     The patient's symptoms of hallucinations improved.     Pepe Stafford was released to  [facility] . At the time of discharge Pepe Stafford was determined to not be a danger to himself or others.          Discharge Medications:     Current Discharge Medication List        START taking these medications    Details   benztropine (COGENTIN) 0.5 MG tablet Take 1 tablet (0.5 mg) by mouth 2 times daily as needed for extrapyramidal symptoms (EPS)  Qty: 10 tablet, Refills: 0    Comments: Contact outpatient provider if symptoms of EPS occur.  Associated Diagnoses: Extrapyramidal and movement disorder, unspecified      diphenhydrAMINE (BENADRYL) 50 MG capsule Take 1 capsule (50 mg) by mouth nightly as needed for sleep  Qty: 30 capsule, Refills: 0    Associated Diagnoses: Other insomnia      hydrOXYzine HCl (ATARAX) 25 MG tablet Take 1 tablet (25 mg) by mouth every 4 hours as needed for anxiety  Qty: 30 tablet, Refills: 0    Associated Diagnoses: Anxiety      melatonin 10 MG TABS tablet Take 1 tablet (10 mg) by mouth at bedtime for 30 days  Qty: 30 tablet, Refills: 0    Associated Diagnoses: Other insomnia      OLANZapine (ZYPREXA) 20 MG tablet Take 1 tablet (20 mg) by mouth at bedtime for 30 days  Qty: 30 tablet, Refills: 0    Associated Diagnoses: Psychosis, unspecified psychosis type (H)      paliperidone ER (INVEGA) 9 MG 24 hr tablet Take 1 tablet (9 mg) by mouth daily for 30 days  Qty: 30 tablet, Refills: 0     Associated Diagnoses: Psychosis, unspecified psychosis type (H)      polyethylene glycol (MIRALAX) 17 GM/Dose powder Take 17 g by mouth daily for 30 days  Qty: 510 g, Refills: 0    Associated Diagnoses: Other constipation                Psychiatric Examination:   Appearance:  awake, alert and adequately groomed  Attitude:  cooperative  Eye Contact:  good  Mood:  better  Affect:  appropriate and in normal range  Speech:  clear, coherent  Psychomotor Behavior:  no evidence of tardive dyskinesia, dystonia, or tics  Thought Process:  goal oriented  Associations:  no loose associations  Thought Content:  no evidence of suicidal ideation or homicidal ideation and auditory hallucinations present but improved since admission  Insight:  fair  Judgment:  fair  Oriented to:  time, person, and place  Attention Span and Concentration:  intact  Recent and Remote Memory:  intact           Discharge Plan:   Psychiatry appointment: Wednesday, August 14, 2024 @ 4:15pm In-person   Provider: PHI Mullins  Location: [place] [address]  Phone: [phone]  Fax: [phone]  Notes: Paperwork to be completed at least 48 hours prior to your appointment will be sent via email ([email or web address]).   HUC TO FAX AVS to above appointment, please    Mental Health Case Management:   A referral was attempted to be made to [facility] for Mental Health Case Management while you were at the hospital, however they are not able to take the referral at this time since you will be going to an IR placement. They requested that at day 45 at the IRTS placement you call into their intake screener to complete the screening for case management services. Please work with [facility] staff to assist you with this. Contact for the screener is listed below:   Wandy Hernandez Ph: [phone] Fax: [phone]    Attestation:  The patient has been seen and evaluated by me,  PHI Flor  CNP

## 2024-08-05 NOTE — PLAN OF CARE
BEH IP Unit Acuity Rating Score (UARS)  Patient is given one point for every criteria they meet.    CRITERIA SCORING   On a 72 hour hold, court hold, committed, stay of commitment, or revocation. 0    Patient LOS on BEH unit exceeds 20 days. 0  LOS: 20   Patient under guardianship, 55+, otherwise medically complex, or under age 11. 0   Suicide ideation without relief of precipitating factors. 0   Current plan for suicide. 0   Current plan for homicide. 0   Imminent risk or actual attempt to seriously harm another without relief of factors precipitating the attempt. 0   Severe dysfunction in daily living (ex: complete neglect for self care, extreme disruption in vegetative function, extreme deterioration in social interactions). 1   Recent (last 7 days) or current physical aggression in the ED or on unit. 0   Restraints or seclusion episode in past 72 hours. 0   Recent (last 7 days) or current verbal aggression, agitation, yelling, etc., while in the ED or unit. 0   Active psychosis. 1   Need for constant or near constant redirection (from leaving, from others, etc).  0   Intrusive or disruptive behaviors. 0   Patient requires 3 or more hours of individualized nursing care per 8-hour shift (i.e. for ADLs, meds, therapeutic interventions). 0   TOTAL 2